# Patient Record
Sex: MALE | Race: WHITE | NOT HISPANIC OR LATINO | Employment: OTHER | URBAN - METROPOLITAN AREA
[De-identification: names, ages, dates, MRNs, and addresses within clinical notes are randomized per-mention and may not be internally consistent; named-entity substitution may affect disease eponyms.]

---

## 2018-07-03 ENCOUNTER — COMMUNICATION - HEALTHEAST (OUTPATIENT)
Dept: TELEHEALTH | Facility: CLINIC | Age: 68
End: 2018-07-03

## 2018-07-03 ENCOUNTER — OFFICE VISIT - HEALTHEAST (OUTPATIENT)
Dept: FAMILY MEDICINE | Facility: CLINIC | Age: 68
End: 2018-07-03

## 2018-07-03 DIAGNOSIS — F40.240 CLAUSTROPHOBIA: ICD-10-CM

## 2018-07-03 DIAGNOSIS — R03.0 ELEVATED BP WITHOUT DIAGNOSIS OF HYPERTENSION: ICD-10-CM

## 2018-07-03 RX ORDER — IBUPROFEN 200 MG
200 TABLET ORAL EVERY 6 HOURS PRN
Status: SHIPPED | COMMUNITY
Start: 2018-07-03 | End: 2022-09-23 | Stop reason: SINTOL

## 2018-07-05 ENCOUNTER — OFFICE VISIT - HEALTHEAST (OUTPATIENT)
Dept: FAMILY MEDICINE | Facility: CLINIC | Age: 68
End: 2018-07-05

## 2018-07-05 DIAGNOSIS — I10 HYPERTENSION: ICD-10-CM

## 2018-07-05 DIAGNOSIS — F41.9 ANXIETY: ICD-10-CM

## 2018-07-09 ENCOUNTER — OFFICE VISIT - HEALTHEAST (OUTPATIENT)
Dept: FAMILY MEDICINE | Facility: CLINIC | Age: 68
End: 2018-07-09

## 2018-07-09 DIAGNOSIS — I10 HYPERTENSION: ICD-10-CM

## 2018-07-09 DIAGNOSIS — F41.9 ANXIETY: ICD-10-CM

## 2018-07-09 ASSESSMENT — MIFFLIN-ST. JEOR: SCORE: 1610.04

## 2018-07-23 ENCOUNTER — OFFICE VISIT - HEALTHEAST (OUTPATIENT)
Dept: FAMILY MEDICINE | Facility: CLINIC | Age: 68
End: 2018-07-23

## 2018-07-23 DIAGNOSIS — Z12.5 ENCOUNTER FOR SCREENING FOR MALIGNANT NEOPLASM OF PROSTATE: ICD-10-CM

## 2018-07-23 DIAGNOSIS — Z12.11 SCREEN FOR COLON CANCER: ICD-10-CM

## 2018-07-23 DIAGNOSIS — F41.9 ANXIETY: ICD-10-CM

## 2018-07-23 DIAGNOSIS — I10 BENIGN ESSENTIAL HYPERTENSION: ICD-10-CM

## 2018-07-23 DIAGNOSIS — Z13.220 SCREENING FOR LIPID DISORDERS: ICD-10-CM

## 2018-07-23 DIAGNOSIS — K21.9 GERD (GASTROESOPHAGEAL REFLUX DISEASE): ICD-10-CM

## 2018-07-23 DIAGNOSIS — Z00.00 ROUTINE HEALTH MAINTENANCE: ICD-10-CM

## 2018-07-23 ASSESSMENT — MIFFLIN-ST. JEOR: SCORE: 1591.9

## 2018-08-07 ENCOUNTER — OFFICE VISIT - HEALTHEAST (OUTPATIENT)
Dept: FAMILY MEDICINE | Facility: CLINIC | Age: 68
End: 2018-08-07

## 2018-08-07 DIAGNOSIS — F41.9 ANXIETY: ICD-10-CM

## 2018-08-07 DIAGNOSIS — I10 BENIGN ESSENTIAL HYPERTENSION: ICD-10-CM

## 2018-08-07 DIAGNOSIS — G47.9 SLEEP DISTURBANCE: ICD-10-CM

## 2018-08-07 DIAGNOSIS — K59.00 CONSTIPATION: ICD-10-CM

## 2018-11-16 ENCOUNTER — RECORDS - HEALTHEAST (OUTPATIENT)
Dept: ADMINISTRATIVE | Facility: OTHER | Age: 68
End: 2018-11-16

## 2019-08-08 ENCOUNTER — COMMUNICATION - HEALTHEAST (OUTPATIENT)
Dept: FAMILY MEDICINE | Facility: CLINIC | Age: 69
End: 2019-08-08

## 2019-08-23 ENCOUNTER — OFFICE VISIT - HEALTHEAST (OUTPATIENT)
Dept: FAMILY MEDICINE | Facility: CLINIC | Age: 69
End: 2019-08-23

## 2019-08-23 DIAGNOSIS — Z13.220 SCREENING FOR LIPID DISORDERS: ICD-10-CM

## 2019-08-23 DIAGNOSIS — Z12.5 SPECIAL SCREENING FOR MALIGNANT NEOPLASM OF PROSTATE: ICD-10-CM

## 2019-08-23 DIAGNOSIS — I83.91 VARICOSE VEINS OF RIGHT LOWER EXTREMITY, UNSPECIFIED WHETHER COMPLICATED: ICD-10-CM

## 2019-08-23 DIAGNOSIS — Z11.59 NEED FOR HEPATITIS C SCREENING TEST: ICD-10-CM

## 2019-08-23 DIAGNOSIS — Z12.11 SCREEN FOR COLON CANCER: ICD-10-CM

## 2019-08-23 DIAGNOSIS — I10 BENIGN ESSENTIAL HYPERTENSION: ICD-10-CM

## 2019-08-23 DIAGNOSIS — Z00.00 ROUTINE GENERAL MEDICAL EXAMINATION AT A HEALTH CARE FACILITY: ICD-10-CM

## 2019-08-23 LAB
ALBUMIN SERPL-MCNC: 4.1 G/DL (ref 3.5–5)
ALP SERPL-CCNC: 55 U/L (ref 45–120)
ALT SERPL W P-5'-P-CCNC: 16 U/L (ref 0–45)
ANION GAP SERPL CALCULATED.3IONS-SCNC: 10 MMOL/L (ref 5–18)
AST SERPL W P-5'-P-CCNC: 16 U/L (ref 0–40)
BILIRUB SERPL-MCNC: 1.3 MG/DL (ref 0–1)
BUN SERPL-MCNC: 18 MG/DL (ref 8–22)
CALCIUM SERPL-MCNC: 9.8 MG/DL (ref 8.5–10.5)
CHLORIDE BLD-SCNC: 104 MMOL/L (ref 98–107)
CHOLEST SERPL-MCNC: 171 MG/DL
CO2 SERPL-SCNC: 27 MMOL/L (ref 22–31)
CREAT SERPL-MCNC: 0.98 MG/DL (ref 0.7–1.3)
FASTING STATUS PATIENT QL REPORTED: YES
GFR SERPL CREATININE-BSD FRML MDRD: >60 ML/MIN/1.73M2
GLUCOSE BLD-MCNC: 99 MG/DL (ref 70–125)
HDLC SERPL-MCNC: 64 MG/DL
LDLC SERPL CALC-MCNC: 91 MG/DL
POTASSIUM BLD-SCNC: 4.3 MMOL/L (ref 3.5–5)
PROT SERPL-MCNC: 6.8 G/DL (ref 6–8)
PSA SERPL-MCNC: 4.2 NG/ML (ref 0–4.5)
SODIUM SERPL-SCNC: 141 MMOL/L (ref 136–145)
TRIGL SERPL-MCNC: 80 MG/DL

## 2019-08-23 ASSESSMENT — MIFFLIN-ST. JEOR: SCORE: 1633.97

## 2019-08-26 LAB — HCV AB SERPL QL IA: NEGATIVE

## 2019-09-03 ENCOUNTER — AMBULATORY - HEALTHEAST (OUTPATIENT)
Dept: LAB | Facility: CLINIC | Age: 69
End: 2019-09-03

## 2019-09-03 DIAGNOSIS — Z12.11 SCREEN FOR COLON CANCER: ICD-10-CM

## 2019-09-03 LAB
HEMOCCULT SP1 STL QL: NEGATIVE
HEMOCCULT SP2 STL QL: NEGATIVE
HEMOCCULT SP3 STL QL: NEGATIVE

## 2019-11-19 ENCOUNTER — OFFICE VISIT - HEALTHEAST (OUTPATIENT)
Dept: FAMILY MEDICINE | Facility: CLINIC | Age: 69
End: 2019-11-19

## 2019-11-19 DIAGNOSIS — M54.2 NECK PAIN: ICD-10-CM

## 2019-11-19 DIAGNOSIS — I10 BENIGN ESSENTIAL HYPERTENSION: ICD-10-CM

## 2019-12-17 ENCOUNTER — OFFICE VISIT - HEALTHEAST (OUTPATIENT)
Dept: FAMILY MEDICINE | Facility: CLINIC | Age: 69
End: 2019-12-17

## 2019-12-17 DIAGNOSIS — M54.2 NECK PAIN: ICD-10-CM

## 2019-12-17 DIAGNOSIS — I10 BENIGN ESSENTIAL HYPERTENSION: ICD-10-CM

## 2020-08-05 ENCOUNTER — COMMUNICATION - HEALTHEAST (OUTPATIENT)
Dept: FAMILY MEDICINE | Facility: CLINIC | Age: 70
End: 2020-08-05

## 2020-08-05 DIAGNOSIS — I10 BENIGN ESSENTIAL HYPERTENSION: ICD-10-CM

## 2020-08-27 ENCOUNTER — OFFICE VISIT - HEALTHEAST (OUTPATIENT)
Dept: INTERNAL MEDICINE | Facility: CLINIC | Age: 70
End: 2020-08-27

## 2020-08-27 ENCOUNTER — COMMUNICATION - HEALTHEAST (OUTPATIENT)
Dept: INTERNAL MEDICINE | Facility: CLINIC | Age: 70
End: 2020-08-27

## 2020-08-27 DIAGNOSIS — L98.9 BENIGN SKIN LESION OF FOREHEAD: ICD-10-CM

## 2020-08-27 DIAGNOSIS — I10 BENIGN ESSENTIAL HYPERTENSION: ICD-10-CM

## 2020-08-27 DIAGNOSIS — Z00.00 ROUTINE GENERAL MEDICAL EXAMINATION AT A HEALTH CARE FACILITY: ICD-10-CM

## 2020-08-27 DIAGNOSIS — E66.09 CLASS 1 OBESITY DUE TO EXCESS CALORIES WITHOUT SERIOUS COMORBIDITY WITH BODY MASS INDEX (BMI) OF 30.0 TO 30.9 IN ADULT: ICD-10-CM

## 2020-08-27 DIAGNOSIS — R35.0 BENIGN PROSTATIC HYPERPLASIA WITH URINARY FREQUENCY: ICD-10-CM

## 2020-08-27 DIAGNOSIS — Z12.5 SCREENING PSA (PROSTATE SPECIFIC ANTIGEN): ICD-10-CM

## 2020-08-27 DIAGNOSIS — Z12.11 SCREEN FOR COLON CANCER: ICD-10-CM

## 2020-08-27 DIAGNOSIS — N40.1 BENIGN PROSTATIC HYPERPLASIA WITH URINARY FREQUENCY: ICD-10-CM

## 2020-08-27 DIAGNOSIS — E66.811 CLASS 1 OBESITY DUE TO EXCESS CALORIES WITHOUT SERIOUS COMORBIDITY WITH BODY MASS INDEX (BMI) OF 30.0 TO 30.9 IN ADULT: ICD-10-CM

## 2020-08-27 DIAGNOSIS — K21.9 GASTROESOPHAGEAL REFLUX DISEASE WITHOUT ESOPHAGITIS: ICD-10-CM

## 2020-08-27 LAB
ALBUMIN SERPL-MCNC: 4 G/DL (ref 3.5–5)
ALP SERPL-CCNC: 58 U/L (ref 45–120)
ALT SERPL W P-5'-P-CCNC: 20 U/L (ref 0–45)
ANION GAP SERPL CALCULATED.3IONS-SCNC: 8 MMOL/L (ref 5–18)
AST SERPL W P-5'-P-CCNC: 16 U/L (ref 0–40)
BILIRUB SERPL-MCNC: 1.5 MG/DL (ref 0–1)
BUN SERPL-MCNC: 15 MG/DL (ref 8–28)
CALCIUM SERPL-MCNC: 9.6 MG/DL (ref 8.5–10.5)
CHLORIDE BLD-SCNC: 101 MMOL/L (ref 98–107)
CHOLEST SERPL-MCNC: 180 MG/DL
CO2 SERPL-SCNC: 27 MMOL/L (ref 22–31)
CREAT SERPL-MCNC: 0.98 MG/DL (ref 0.7–1.3)
ERYTHROCYTE [DISTWIDTH] IN BLOOD BY AUTOMATED COUNT: 12.3 % (ref 11–14.5)
FASTING STATUS PATIENT QL REPORTED: YES
GFR SERPL CREATININE-BSD FRML MDRD: >60 ML/MIN/1.73M2
GLUCOSE BLD-MCNC: 102 MG/DL (ref 70–125)
HCT VFR BLD AUTO: 47.7 % (ref 40–54)
HDLC SERPL-MCNC: 60 MG/DL
HGB BLD-MCNC: 16.5 G/DL (ref 14–18)
LDLC SERPL CALC-MCNC: 105 MG/DL
MCH RBC QN AUTO: 31.8 PG (ref 27–34)
MCHC RBC AUTO-ENTMCNC: 34.5 G/DL (ref 32–36)
MCV RBC AUTO: 92 FL (ref 80–100)
PLATELET # BLD AUTO: 192 THOU/UL (ref 140–440)
PMV BLD AUTO: 8.1 FL (ref 7–10)
POTASSIUM BLD-SCNC: 4.5 MMOL/L (ref 3.5–5)
PROT SERPL-MCNC: 6.9 G/DL (ref 6–8)
PSA SERPL-MCNC: 3.6 NG/ML (ref 0–6.5)
RBC # BLD AUTO: 5.18 MILL/UL (ref 4.4–6.2)
SODIUM SERPL-SCNC: 136 MMOL/L (ref 136–145)
TRIGL SERPL-MCNC: 74 MG/DL
WBC: 6.1 THOU/UL (ref 4–11)

## 2020-08-27 RX ORDER — LISINOPRIL 30 MG/1
30 TABLET ORAL DAILY
Qty: 180 TABLET | Refills: 1 | Status: SHIPPED | OUTPATIENT
Start: 2020-08-27 | End: 2021-10-14

## 2020-08-27 RX ORDER — ACETAMINOPHEN/DIPHENHYDRAMINE 500MG-25MG
TABLET ORAL
Status: SHIPPED | COMMUNITY
Start: 2020-03-01 | End: 2022-09-23

## 2020-08-27 ASSESSMENT — MIFFLIN-ST. JEOR: SCORE: 1637.99

## 2020-09-22 ENCOUNTER — RECORDS - HEALTHEAST (OUTPATIENT)
Dept: ADMINISTRATIVE | Facility: OTHER | Age: 70
End: 2020-09-22

## 2020-09-22 LAB — COLOGUARD-ABSTRACT: NEGATIVE

## 2020-09-28 ENCOUNTER — COMMUNICATION - HEALTHEAST (OUTPATIENT)
Dept: ADMINISTRATIVE | Facility: CLINIC | Age: 70
End: 2020-09-28

## 2020-10-01 ENCOUNTER — RECORDS - HEALTHEAST (OUTPATIENT)
Dept: HEALTH INFORMATION MANAGEMENT | Facility: CLINIC | Age: 70
End: 2020-10-01

## 2020-10-02 ENCOUNTER — RECORDS - HEALTHEAST (OUTPATIENT)
Dept: ADMINISTRATIVE | Facility: OTHER | Age: 70
End: 2020-10-02

## 2021-01-06 ENCOUNTER — OFFICE VISIT - HEALTHEAST (OUTPATIENT)
Dept: INTERNAL MEDICINE | Facility: CLINIC | Age: 71
End: 2021-01-06

## 2021-01-06 DIAGNOSIS — I10 BENIGN ESSENTIAL HYPERTENSION: ICD-10-CM

## 2021-01-06 DIAGNOSIS — R35.0 BENIGN PROSTATIC HYPERPLASIA WITH URINARY FREQUENCY: ICD-10-CM

## 2021-01-06 DIAGNOSIS — N40.1 BENIGN PROSTATIC HYPERPLASIA WITH URINARY FREQUENCY: ICD-10-CM

## 2021-01-06 DIAGNOSIS — K59.01 SLOW TRANSIT CONSTIPATION: ICD-10-CM

## 2021-01-06 LAB — TSH SERPL DL<=0.005 MIU/L-ACNC: 2.78 UIU/ML (ref 0.3–5)

## 2021-03-02 ENCOUNTER — AMBULATORY - HEALTHEAST (OUTPATIENT)
Dept: NURSING | Facility: CLINIC | Age: 71
End: 2021-03-02

## 2021-03-23 ENCOUNTER — AMBULATORY - HEALTHEAST (OUTPATIENT)
Dept: NURSING | Facility: CLINIC | Age: 71
End: 2021-03-23

## 2021-04-30 ENCOUNTER — RECORDS - HEALTHEAST (OUTPATIENT)
Dept: ADMINISTRATIVE | Facility: OTHER | Age: 71
End: 2021-04-30

## 2021-05-31 ENCOUNTER — RECORDS - HEALTHEAST (OUTPATIENT)
Dept: ADMINISTRATIVE | Facility: CLINIC | Age: 71
End: 2021-05-31

## 2021-05-31 NOTE — PROGRESS NOTES
Assessment and Plan:     1. Routine general medical examination at a health care facility    2. Benign essential hypertension  Optimal control.  Continue   - Comprehensive Metabolic Panel  - lisinopril (PRINIVIL,ZESTRIL) 30 MG tablet; Take 1 tablet (30 mg total) by mouth daily.  Dispense: 90 tablet; Refill: 3    3. Screen for colon cancer  Declines colonoscopy  - Occult Blood(ICT); Future    4. Screening for lipid disorders  - Lipid [place] FASTING    5. Special screening for malignant neoplasm of prostate  - PSA, Annual Screen (Prostatic-Specific Antigen)    6. Need for hepatitis C screening test  - Hepatitis C Antibody (Anti-HCV)    7. Varicose veins of right lower extremity, unspecified whether complicated  Not painful and no significant swelling.  Offered a vascular consult, but patient declines at time.  Encouraged a compression stocking.     The patient's current medical problems were reviewed.    I have had an Advance Directives discussion with the patient.  The following health maintenance schedule was reviewed with the patient and provided in printed form in the after visit summary:   Health Maintenance   Topic Date Due     HEPATITIS C SCREENING  1950     FECAL OCCULT BLOOD/FIT ANNUAL COLON CANCER SCREENING  01/12/1968     ZOSTER VACCINES (1 of 2) 01/12/2000     TD 18+ HE  09/08/2002     PNEUMOCOCCAL POLYSACCHARIDE VACCINE AGE 65 AND OVER  01/12/2015     INFLUENZA VACCINE RULE BASED (1) 08/01/2019     MEDICARE ANNUAL WELLNESS VISIT  08/23/2020     FALL RISK ASSESSMENT  08/23/2020     ADVANCE CARE PLANNING  07/23/2023     PNEUMOCOCCAL CONJUGATE VACCINE FOR ADULTS (PCV13 OR PREVNAR)  Completed        Subjective:   Chief Complaint: Roland Dejesus is an 69 y.o. male here for an Annual Wellness visit.   HPI: Patient is  with one daughter.  Him and his wife enjoy traveling.  They have a couple of road trips planned this fall.    History of hypertension.  He is on lisinopril.  Checking blood  pressures at home with very good readings.  He does have a log with him today.  They range between 121//73.  Denies any chest pain, shortness of breath, lightheadedness/dizziness, or palpitations.  He is wondering if he still needs to take a daily aspirin.    Patient has chronic varicose veins in his right leg.  He did have them stripped about 25 years ago.  They do not offer him significant pain, and he does not variance a lot of swelling in the leg.  No varicose veins in the left leg.  Wondering if he needs to get this looked at.  He has not had any redness to the area.    Patient has never had a colonoscopy.  He is not really very interested.  His grandfather had a history of colon cancer in his 90s.  Patient denies any blood in his stools.  He would be open to doing some fecal testing.  We did discuss that if this is positive, I would recommend a colonoscopy.    She denies any disruptive urinary symptoms.  No trouble starting or stopping his urine stream.  No urinary frequency or urgency.    Patient received a [employer] last year pharmacy.    Gets intermittent ringing in his ears.  This is not terribly bothersome.    Patient dealt with some significant anxiety last year.  He did end up seeing a therapist, but did not think this was terribly helpful.  Reports his anxiety is very manageable and he has not had issues the last year with this.  Denies any depressive symptoms.    Review of Systems: Please see above.  The rest of the review of systems are negative for all systems.    Patient Care Team:  Janet Hernandez NP as PCP - General (Family Medicine)     Patient Active Problem List   Diagnosis     Mantoux: positive     Varicose veins of leg with swelling, right     Benign essential hypertension     Anxiety     Sleep disturbance     Past Medical History:   Diagnosis Date     Anxiety       Past Surgical History:   Procedure Laterality Date     KNEE ARTHROSCOPY Right      VARICOSE VEIN SURGERY        Family  History   Problem Relation Age of Onset     COPD Mother      Heart disease Mother      Heart failure Mother      Parkinsonism Father      Aneurysm Brother      Prostate cancer Brother      Anxiety disorder Brother      Depression Brother      Alcohol abuse Brother      Drug abuse Brother       Social History     Socioeconomic History     Marital status:      Spouse name: Not on file     Number of children: Not on file     Years of education: Not on file     Highest education level: Not on file   Occupational History     Not on file   Social Needs     Financial resource strain: Not on file     Food insecurity:     Worry: Not on file     Inability: Not on file     Transportation needs:     Medical: Not on file     Non-medical: Not on file   Tobacco Use     Smoking status: Former Smoker     Smokeless tobacco: Never Used   Substance and Sexual Activity     Alcohol use: Yes     Alcohol/week: 1.8 oz     Types: 3 Glasses of wine per week     Drug use: No     Sexual activity: Yes     Partners: Female   Lifestyle     Physical activity:     Days per week: Not on file     Minutes per session: Not on file     Stress: Not on file   Relationships     Social connections:     Talks on phone: Not on file     Gets together: Not on file     Attends L[employer] day service: Not on file     Active member of club or organization: Not on file     Attends meetings of clubs or organizations: Not on file     Relationship status: Not on file     Intimate partner violence:     Fear of current or ex partner: Not on file     Emotionally abused: Not on file     Physically abused: Not on file     Forced sexual activity: Not on file   Other Topics Concern     Not on file   Social History Narrative     Not on file      Current Outpatient Medications   Medication Sig Dispense Refill     aspirin 81 MG EC tablet Take 81 mg by mouth daily.       ibuprofen (ADVIL,MOTRIN) 200 MG tablet Take 200 mg by mouth every 6 (six) hours as needed for pain.        "loratadine 5 mg TbDL Take 5 mg by mouth daily.        ranitidine (ZANTAC) 75 MG tablet Take 75 mg by mouth 2 (two) times a day.        lisinopril (PRINIVIL,ZESTRIL) 30 MG tablet Take 1 tablet (30 mg total) by mouth daily. 90 tablet 3     No current facility-administered medications for this visit.       Objective:   Vital Signs:   Visit Vitals  /88   Pulse (!) 58   Temp 97.9  F (36.6  C)   Ht 5' 8\" (1.727 m)   Wt 199 lb 6.4 oz (90.4 kg)   BMI 30.32 kg/m         VisionScreening:  No exam data present     PHYSICAL EXAM    General Appearance: Alert, cooperative, no distress, appears stated age  Eyes: PERRL, conjunctiva/corneas clear, EOM's intact. Wearing corrective glasses.  Ears: Normal TM's and external ear canals, both ears  Nose: Nares normal, septum midline  Throat: Lips, mucosa, and tongue normal; teeth and gums normal  Neck: Supple, symmetrical, trachea midline, no adenopathy; thyroid: not enlarged, symmetric, no tenderness/mass/nodules; no carotid bruit or JVD  Back: no CVA tenderness  Lungs: Clear to auscultation bilaterally, respirations unlabored  Heart: Regular rate and rhythm, S1 and S2 normal, no murmur, rub, or gallop   Abdomen: Soft, non-tender, bowel sounds active all four quadrants,  no masses, no organomegaly  Extremities: Extremities normal, atraumatic, no cyanosis or edema. Varicose veins to the right leg.   Skin: Skin color, texture, turgor normal, no rashes  Lymph nodes: Cervical, supraclavicular, and axillary nodes normal  Neurologic: Normal   Psychologic: appropriate affective, answers all of my questions appropriately. No hallucinations, delusion, or suicidal ideationss      Assessment Results 8/23/2019   Activities of Daily Living No help needed   Instrumental Activities of Daily Living No help needed   Get Up and Go Score Less than 12 seconds   Mini Cog Total Score 5   Some recent data might be hidden     A Mini-Cog score of 0-2 suggests the possibility of dementia, score of 3-5 " suggests no dementia    Identified Health Risks:     The patient's PHQ-9 score is normal.   Information regarding advance directives (living brink), including where he can download the appropriate form, was provided to the patient via the AVS.     Janet Hernandez NP

## 2021-06-01 VITALS — BODY MASS INDEX: 29.55 KG/M2 | HEIGHT: 68 IN | WEIGHT: 195 LBS

## 2021-06-01 VITALS — WEIGHT: 197.9 LBS

## 2021-06-01 VITALS — BODY MASS INDEX: 28.95 KG/M2 | HEIGHT: 68 IN | WEIGHT: 191 LBS

## 2021-06-01 VITALS — BODY MASS INDEX: 28.64 KG/M2 | WEIGHT: 187 LBS

## 2021-06-01 VITALS — WEIGHT: 198.38 LBS

## 2021-06-03 VITALS — BODY MASS INDEX: 30.22 KG/M2 | WEIGHT: 199.4 LBS | HEIGHT: 68 IN

## 2021-06-03 NOTE — PROGRESS NOTES
"Assessment/Plan:     1. Benign essential hypertension  Suboptimal control.  Could be related to recent pain, but I would like better control regardless.  Add Amlodipine 5 mg once daily.  Continue Lisinopril at current dose. Consider titrating Lisinopril to max dose of 40 mg if suboptimal control at follow up.  Encourage patient to continue checking blood pressures at home with goal < 140/90.  Follow-up in 4 weeks.  - amLODIPine (NORVASC) 5 MG tablet; Take 1 tablet (5 mg total) by mouth daily.  Dispense: 90 tablet; Refill: 0    2. Neck pain  Neck and upper back pain improved since recent ED visit.  Patient is requesting a Medrol Dosepak, and I do think this would be appropriate.  Discussed proper use and possible side effects of this medication.  I recommend limiting his Advil/ibuprofen while he is using this.  Continue chiropractic care, as this has been helpful.  Otherwise, follow-up with me with new/worsening symptoms.  - methylPREDNISolone (MEDROL DOSEPACK) 4 mg tablet; Take 1 tablet (4 mg total) by mouth daily for 6 days. Follow package directions  Dispense: 21 tablet; Refill: 0        Subjective:     Roland Dejesus is a 69 y.o. male who presents for follow-up regarding recent ED visit.  Patient was seen in the ED 2 days ago with symptoms of right neck and upper back pain.  He is currently working with a chiropractor for this.  Reports a \"burning\" sensation to his upper back area with specific neck movements.  He denies any radicular pain down into the arm.  No trouble with  strength.  Patient was treated with IV pain medication and diazepam.  Symptoms did improve in the ER.  Patient's blood pressure was noted to be elevated.  He is on lisinopril 30 mg once daily for blood pressure.  Patient has noticed some mild elevations of his blood pressures at home over the last couple of months.  He does have a log with him.  Readings in October were in the 140s over 70s.  The last couple of days, he has had readings " of 198/89 and 168/91.  Patient is otherwise feeling well.  He denies any chest pain, shortness of breath, headaches, or lightheadedness/dizziness.  He is not feeling terribly fatigued.  His pain is better today.  He is wondering if a Medrol Dosepak would be beneficial for his neck and upper back pain, as recommended by his chiropractor.  Patient is currently taking Advil 2-3 times a day.  Of note, he also had a CTA of the neck in the ER, and this was negative.      The following portions of the patient's history were reviewed and updated as appropriate: allergies, current medications.    Review of Systems  A comprehensive review of systems was performed and was otherwise negative    Objective:     BP (!) 188/102   Pulse 64   Temp 97.8  F (36.6  C)   Wt 201 lb 14.4 oz (91.6 kg)   BMI 30.70 kg/m      General Appearance: Alert, cooperative, no distress, appears stated age  Neck: Cervical spine is straight and nontender.  Tenderness palpated to the right cervical paraspinal muscles and extending into the right trapezius.  Normal range of motion, but increased discomfort with left rotation.   strength equal bilaterally.  Lungs: Clear to auscultation bilaterally, respirations unlabored  Heart: Regular rate and rhythm, S1 and S2 normal, no murmur, rub, or gallop. No LE edema     Janet Hernandez NP

## 2021-06-04 VITALS
SYSTOLIC BLOOD PRESSURE: 122 MMHG | HEART RATE: 64 BPM | WEIGHT: 204.7 LBS | DIASTOLIC BLOOD PRESSURE: 76 MMHG | BODY MASS INDEX: 31.12 KG/M2

## 2021-06-04 VITALS
TEMPERATURE: 98.1 F | BODY MASS INDEX: 30.3 KG/M2 | OXYGEN SATURATION: 98 % | DIASTOLIC BLOOD PRESSURE: 80 MMHG | HEIGHT: 68 IN | WEIGHT: 199.9 LBS | SYSTOLIC BLOOD PRESSURE: 132 MMHG | HEART RATE: 59 BPM

## 2021-06-04 VITALS
DIASTOLIC BLOOD PRESSURE: 102 MMHG | HEART RATE: 64 BPM | WEIGHT: 201.9 LBS | BODY MASS INDEX: 30.7 KG/M2 | SYSTOLIC BLOOD PRESSURE: 188 MMHG | TEMPERATURE: 97.8 F

## 2021-06-04 NOTE — PROGRESS NOTES
Assessment/Plan:     1. Benign essential hypertension  Blood pressures have been normal without the addition of Amlodipine.  Suspect elevation was associated with pain.  Optimal BP today in clinic.  Will hold off on starting the Amlodipine.  Continue Lisinopril.  Continue monitoring BPs at home with goal < 140/90.  He will follow up with increased readings.     2. Neck pain  This improved with a Medrol dosepak.  He continues to see a chiropractor.         Subjective:     Roland Dejesus is a 69 y.o. male who presents for follow-up regarding hypertension.  Patient was started on amlodipine about 1 month ago due to elevated blood pressures.  His blood pressures were found to be elevated when he presented to the ED with some neck pain.  I treated patient with a Medrol Dosepak last month due to the neck pain.  He did not start the amlodipine, as he did not want to start 2 medications at once.  However, his blood pressures have been well controlled at home just on his lisinopril.  Readings range from 126//74.  In general, average readings are in the 130s over 80s.  Patient's neck pain is better.  He is still working with a chiropractor.      The following portions of the patient's history were reviewed and updated as appropriate: allergies, current medications.    Review of Systems  A comprehensive review of systems was performed and was otherwise negative    Objective:     /76   Pulse 64   Wt 204 lb 11.2 oz (92.9 kg)   BMI 31.12 kg/m      General Appearance: Alert, cooperative, no distress, appears stated age  Lungs: Clear to auscultation bilaterally, respirations unlabored  Heart: Regular rate and rhythm, S1 and S2 normal, no murmur, rub, or gallop    Janet Hernandez NP

## 2021-06-05 VITALS
BODY MASS INDEX: 26.6 KG/M2 | WEIGHT: 175.5 LBS | SYSTOLIC BLOOD PRESSURE: 168 MMHG | OXYGEN SATURATION: 96 % | HEART RATE: 60 BPM | TEMPERATURE: 97.6 F | DIASTOLIC BLOOD PRESSURE: 100 MMHG

## 2021-06-10 NOTE — TELEPHONE ENCOUNTER
Medication Question or Clarification  Who is calling: Patient  What medication are you calling about (include dose and sig)?:   lisinopril (PRINIVIL,ZESTRIL) 30 MG tablet  30 mg, Oral, DAILY         Summary: Take 1 tablet (30 mg total) by mouth daily., Starting Fri 8/23/2019, Normal      Who prescribed the medication?: Janet Hernandez, NP  What is your question/concern?: Patient needs a bridge until his annual visit.  He is asking to make an appointment for AWNOHEMI in September.  He will be out of medication on 8/22/2020.  Will Janet bridge the lisinopril?  Please advise.     Requested Pharmacy: [id number]  Okay to leave a detailed message?: Yes

## 2021-06-10 NOTE — PROGRESS NOTES
Assessment and Plan:     Annual wellness visit for this 70-year-old man, retired [employer], who has been enjoying good health over the last year.  Issues are benign essential hypertension, with good blood pressure control on monotherapy with lisinopril.  Overweight with body mass index of 30.3, he really does need to lose 25 to 30 pounds over the next year.  Gastroesophageal reflux, was getting good results from ranitidine, recommend switch to famotidine.  Raised erythematous skin lesion on forehead, will send to dermatology.  Due for age-appropriate average risk colon cancer screening, will pursue Cologuard stool test.  Mild symptoms of benign prostatic hyperplasia, with some urinary bladder frequency, but prostate palpably normal on physical exam, will check PSA today.  Up-to-date on vaccinations except shingles, and also he should get annual flu vaccine.  History of cervical radiculopathic neck pain November 2019 which resolved after administration of a steroid Dosepak.    He is fasting this morning, so we will get comprehensive metabolic panel, blood cell counts, lipid profile, and screening PSA.  Cologuard is ordered.    Going issue by issue:    Benign essential hypertension, with good blood pressure control on monotherapy with lisinopril.  Today's in clinic blood pressure of 132/80 is quite good.  He showed me his home blood pressure readings, and most systolic readings are in the 120s, most diastolics in the 70s.  He does need to lose some weight, which would be good for blood pressure.  He did not need to start amlodipine.  He told me that alcohol consumption is approximately less than 1 drink per day, typically a beer or 1 glass of wine.  That does not sound excessive.  It does have some calories however.  I do not think that degree of alcohol consumption would raise blood pressure.  He snores, but he is quite sure he does not have obstructive sleep apnea.    Overweight with body mass index of 30.3, he  really does need to lose 25 to 30 pounds over the next year.  For weight control, I reminded him about the importance of portion control, eating slower, and identifying problem foods to curtail or eliminate.  Even 1 serving of alcohol per day brings may be 2 or 300 carbohydrate calories.  He enjoys walking and biking.  I would encourage her to add some strength training.  Strengthening the muscles of his upper back might also be good for his neck.    Gastroesophageal reflux, was getting good results from ranitidine, recommend switch to famotidine.  I told her that famotidine is the generic form of Pepcid.  He can also use the proton pump inhibitor omeprazole which is available over-the-counter as Prilosec OTC.    Raised erythematous skin lesion on forehead, will send to dermatology.  He told me the skin lesion on his forehead is been there for maybe 2 years.  It scales up, then sometimes bleeds.  Is not pigmented, but I do think it needs to be checked by dermatology to rule out the possibility of amelanotic melanoma.    Due for age-appropriate average risk colon cancer screening, will pursue Cologuard stool test.      Mild symptoms of benign prostatic hyperplasia, with some urinary bladder frequency, but prostate palpably normal on physical exam, will check PSA today.      Up-to-date on vaccinations except shingles, and also he should get annual flu vaccine.     History of cervical radiculopathic neck pain November 2019 which resolved after administration of a steroid Dosepak.      The patient's current medical problems were reviewed.    I have had an Advance Directives discussion with the patient.  The following health maintenance schedule was reviewed with the patient and provided in printed form in the after visit summary:   Health Maintenance   Topic Date Due     ZOSTER VACCINES (1 of 2) 01/12/2000     PNEUMOCOCCAL IMMUNIZATION 65+ LOW/MEDIUM RISK (1 of 2 - PCV13) 01/12/2015     INFLUENZA VACCINE RULE BASED (1)  08/01/2020     FALL RISK ASSESSMENT  08/23/2020     MEDICARE ANNUAL WELLNESS VISIT  08/23/2020     COLORECTAL CANCER SCREENING  09/03/2020     LIPID  08/23/2024     ADVANCE CARE PLANNING  08/23/2024     TD 18+ HE  08/23/2029     HEPATITIS C SCREENING  Completed     HEPATITIS B VACCINES  Aged Out        Subjective:   Chief Complaint: Roland Dejesus is an 70 y.o. male here for an Annual Wellness visit.   HPI:      Benign essential hypertension  Did not need Amlodipine.  Continue Lisinopril    BP Readings from Last 3 Encounters:   08/27/20 132/80   12/17/19 122/76   11/19/19 (!) 188/102     GERD  Was taking zantac  Swallowing OK    Lab Results   Component Value Date    CHOL 171 08/23/2019     Lab Results   Component Value Date    HDL 64 08/23/2019     Lab Results   Component Value Date    LDLCALC 91 08/23/2019     Lab Results   Component Value Date    TRIG 80 08/23/2019     No components found for: CHOLHDL       Lab Results   Component Value Date    PSA 4.2 08/23/2019     Neck pain- November 2019  This improved with a Medrol dosepak.  He continues to see a chiropractor.  He is still working with a chiropractor.    9/3/19 1217        Fecal Occult Bld (ICT) 1  Negative  Negative       Fecal Occult Blood (ICT) 2  Negative  Negative       Fecal Occult Blood (ICT) 3  Negative      Could get shingles vaccine     Immunization History   Administered Date(s) Administered     Hep A, Adult IM (19yr & older) 12/02/1995     Hep A, historic 08/18/1994, 09/20/1994     Hep B, historic 06/02/1994, 07/14/1994, 12/28/1994     IPV 09/08/1992     Immune Globulin (Ig) Received 09/08/1992, 02/02/1993, 07/28/1993, 06/02/1994     Influenza high dose,seasonal,PF, 65+ yrs 10/25/2019     Pneumococcal Vaccine, Unspecified 01/23/2020     Td,adult,historic,unspecified 09/08/1992     Tdap 08/23/2019     Typhoid, historic, Unspecified 07/14/1994       Review of Systems:    Please see above.  The rest of the review of systems are negative for all  systems.    Patient Care Team:  Janet Hernandez NP as PCP - General (Family Medicine)  Janet Hernandez NP as Assigned PCP     Patient Active Problem List   Diagnosis     Mantoux: positive     Varicose veins of leg with swelling, right     Benign essential hypertension     Anxiety     Sleep disturbance     Past Medical History:   Diagnosis Date     Anxiety       Past Surgical History:   Procedure Laterality Date     KNEE ARTHROSCOPY Right      VARICOSE VEIN SURGERY        Family History   Problem Relation Age of Onset     COPD Mother      Heart disease Mother      Heart failure Mother      Parkinsonism Father      Aneurysm Brother      Prostate cancer Brother      Anxiety disorder Brother      Depression Brother      Alcohol abuse Brother      Drug abuse Brother       Social History     Socioeconomic History     Marital status:      Spouse name: Not on file     Number of children: Not on file     Years of education: Not on file     Highest education level: Not on file   Occupational History     Not on file   Social Needs     Financial resource strain: Not on file     Food insecurity     Worry: Not on file     Inability: Not on file     Transportation needs     Medical: Not on file     Non-medical: Not on file   Tobacco Use     Smoking status: Former Smoker     Smokeless tobacco: Never Used   Substance and Sexual Activity     Alcohol use: Yes     Alcohol/week: 3.0 standard drinks     Types: 3 Glasses of wine per week     Drug use: No     Sexual activity: Yes     Partners: Female   Lifestyle     Physical activity     Days per week: Not on file     Minutes per session: Not on file     Stress: Not on file   Relationships     Social connections     Talks on phone: Not on file     Gets together: Not on file     Attends [employer] service: Not on file     Active member of club or organization: Not on file     Attends meetings of clubs or organizations: Not on file     Relationship status: Not on file     Intimate  "partner violence     Fear of current or ex partner: Not on file     Emotionally abused: Not on file     Physically abused: Not on file     Forced sexual activity: Not on file   Other Topics Concern     Not on file   Social History Narrative     Not on file      Current Outpatient Medications   Medication Sig Dispense Refill     aspirin 81 MG EC tablet Take 81 mg by mouth daily.       glucosamine HCl/chondroitin sams (GLUCOSAMINE-CHONDROITIN) 2,000-1,200 mg/30 mL Liqd        ibuprofen (ADVIL,MOTRIN) 200 MG tablet Take 200 mg by mouth every 6 (six) hours as needed for pain.       lisinopriL (PRINIVIL,ZESTRIL) 30 MG tablet Take 1 tablet (30 mg total) by mouth daily. 90 tablet 0     loratadine 5 mg TbDL Take 5 mg by mouth daily.        UNABLE TO FIND Med Name: Tumeric       No current facility-administered medications for this visit.       Objective:   Vital Signs:   Visit Vitals  /80 (Patient Site: Right Arm, Patient Position: Sitting, Cuff Size: Adult Large)   Pulse (!) 59   Temp 98.1  F (36.7  C) (Oral)   Ht 5' 8.11\" (1.73 m)   Wt 199 lb 14.4 oz (90.7 kg)   SpO2 98%   BMI 30.30 kg/m           VisionScreening:  No exam data present     PHYSICAL EXAM    General: Alert, in no distress  Pleasant, moderately overweight, normal mental status, moves easily in exam room.  Skin: + Raised erythematous lesion about 1 cm diameter on his left mid forehead, there is shallow erosion centrally.    Eyes/nose/throat: Eyes without scleral icterus, eye movements normal, pupils equal and reactive, oropharynx clear, ears with normal TM's  MSK: Neck with good ROM  Lymphatic: Neck without adenopathy or masses  Endocrine: Thyroid with no nodules to palpation  Pulm: Lungs clear to auscultation bilaterally  Cardiac: Heart with regular rate and rhythm, no murmur or gallop  GI: Abdomen soft, nontender. No palpable enlargement of liver or spleen  MSK: Extremities no tenderness or edema  Neuro: Moves all extremities, without focal " weakness  Psych: Alert, normal mental status. Normal affect and speech  Prostate normal size, smooth, no nodules      Assessment Results 8/27/2020   Activities of Daily Living No help needed   Instrumental Activities of Daily Living No help needed   Get Up and Go Score -   Mini Cog Total Score 5   Some recent data might be hidden     A Mini-Cog score of 0-2 suggests the possibility of dementia, score of 3-5 suggests no dementia      Identified Health Risks:     Information regarding advance directives (living brink), including where he can download the appropriate form, was provided to the patient via the AVS.

## 2021-06-10 NOTE — TELEPHONE ENCOUNTER
Bridged patient's medication.  It appears that he is scheduled with Dr. Mahajan later this month.    Janet Hernandez NP

## 2021-06-11 ENCOUNTER — OFFICE VISIT - HEALTHEAST (OUTPATIENT)
Dept: INTERNAL MEDICINE | Facility: CLINIC | Age: 71
End: 2021-06-11

## 2021-06-11 DIAGNOSIS — M54.2 NECK PAIN: ICD-10-CM

## 2021-06-11 NOTE — TELEPHONE ENCOUNTER
Patient notified of clinician's message and verbalized understanding. No further questions at this time.   Yolanda Shi CMA ............... 10:23 AM, 09/28/20

## 2021-06-14 NOTE — PROGRESS NOTES
Office Visit - Follow Up   Roland Dejesus   70 y.o. male    Date of Visit: 1/6/2021    Chief Complaint   Patient presents with     Hernia     Constipation        -------------------------------------------------------------------------------------------------------------------------  Assessment and Plan    Constipation  Stools hard and dry  I reviewed with him the diet has been using to lose weight, and it sounds pretty healthy with plenty of fiber.  He has experienced constipation in the past when he went on a diet to lose weight.  MiraLAX seem to help a little, but he only took it for a few days.  I told him MiraLAX (polyethylene glycol powder) is safe to use even every day, 1 scoop dissolved in liquid.  It is an osmotic laxative, adds water content to the stools.  He could also use a stool softener capsule such as docusate.  I would generally avoid stimulant laxatives such as senna.    He did have a negative Cologuard test September 22, 2020.  He is never had a colonoscopy.  I told him that it would be quite reasonable to just simply get a colonoscopy in response to a change in stool habit.  He will let me know how his symptoms go, and I would have a low threshold to push him toward a colonoscopy.    Lets check a TSH thyroid cascade today, since last check was in 2018.    9/22/20    COLOGUARD_EXT Negative      Hernia inguinal right  Noticed more as he lost weight  Not painful  Hernia is approximately 3 cm, easily reducible, does not enter the scrotum.  He does not seem to be bothering him too much.  I told him that if it does start to become bothersome, please let me know, then I would send him for general surgery consultation which then might lead to surgical repair which could be done using a laparoscopic robotic technique, as an outpatient procedure, with excellent recovery time.    Benign essential hypertension, blood pressure control has been good on monotherapy with lisinopril 30 mg a day.  For some reason  his blood pressure this morning January 6 is a little bit elevated at 150/94.  He gets better readings on his home machine.      Perhaps could increase the lisinopril up to the full dose of 40 mg a day.  I am little surprised to see an elevated blood pressure reading was lost weight successfully.  Let me know how his numbers behave.    At home gets BP readings about 135/85  lisinopriL (PRINIVIL,ZESTRIL) 30 MG tablet    BP Readings from Last 3 Encounters:   01/06/21 (!) 150/94   08/27/20 132/80   12/17/19 122/76     Successful weight loss  He want to lose 5 pounds more  BMI now 26.6  He feels better    Diet: breakfast yogurt and fruit, biscuit with fiber  Lunch salad  Supper control portion size  No rice, no pasta, No bread    Wt Readings from Last 3 Encounters:   01/06/21 175 lb 8 oz (79.6 kg)   08/27/20 199 lb 14.4 oz (90.7 kg)   12/17/19 204 lb 11.2 oz (92.9 kg)      Gastroesophageal reflux, was getting good results from ranitidine, recommend switch to famotidine.  I told her that famotidine is the generic form of Pepcid.  He can also use the proton pump inhibitor omeprazole which is available over-the-counter as Prilosec OTC.     Raised erythematous skin lesion on forehead, will send to dermatology.  He told me the skin lesion on his forehead is been there for maybe 2 years.  It scales up, then sometimes bleeds.  Is not pigmented, but I do think it needs to be checked by dermatology to rule out the possibility of amelanotic melanoma.     Due for age-appropriate average risk colon cancer screening, will pursue Cologuard stool test.       Mild symptoms of benign prostatic hyperplasia, with some urinary bladder frequency, but prostate palpably normal on physical exam, will check PSA today.       Up-to-date on vaccinations except shingles  I advised to get the COVID-19 vaccination when it becomes available to his cohort, I would anticipate February or March.    History of cervical radiculopathic neck pain November  2019 which resolved after administration of a steroid Dosepak.      --------------------------------------------------------------------------------------------------------------------------  History of Present Illness  This 70 y.o. old comes for follow-up and discuss to do issues of constipation and right inguinal hernia    Constipation  Stools hard and dry  I reviewed with him the diet has been using to lose weight, and it sounds pretty healthy with plenty of fiber.  He has experienced constipation in the past when he went on a diet to lose weight.  MiraLAX seem to help a little, but he only took it for a few days.  I told him MiraLAX (polyethylene glycol powder) is safe to use even every day, 1 scoop dissolved in liquid.  It is an osmotic laxative, adds water content to the stools.  He could also use a stool softener capsule such as docusate.  I would generally avoid stimulant laxatives such as senna.    He did have a negative Cologuard test September 22, 2020.  He is never had a colonoscopy.  I told him that it would be quite reasonable to just simply get a colonoscopy in response to a change in stool habit.  He will let me know how his symptoms go, and I would have a low threshold to push him toward a colonoscopy.    Lets check a TSH thyroid cascade today, since last check was in 2018.    9/22/20    COLOGUARD_EXT Negative      Hernia inguinal right  Noticed more as he lost weight  Not painful  Hernia is approximately 3 cm, easily reducible, does not enter the scrotum.  He does not seem to be bothering him too much.  I told him that if it does start to become bothersome, please let me know, then I would send him for general surgery consultation which then might lead to surgical repair which could be done using a laparoscopic robotic technique, as an outpatient procedure, with excellent recovery time.      Wt Readings from Last 3 Encounters:   01/06/21 175 lb 8 oz (79.6 kg)   08/27/20 199 lb 14.4 oz (90.7 kg)    12/17/19 204 lb 11.2 oz (92.9 kg)     BP Readings from Last 3 Encounters:   01/06/21 (!) 150/94   08/27/20 132/80   12/17/19 122/76       Lab Results   Component Value Date    WBC 6.1 08/27/2020    HGB 16.5 08/27/2020    HCT 47.7 08/27/2020     08/27/2020    CHOL 180 08/27/2020    TRIG 74 08/27/2020    HDL 60 08/27/2020    ALT 20 08/27/2020    AST 16 08/27/2020     08/27/2020    K 4.5 08/27/2020     08/27/2020    CREATININE 0.98 08/27/2020    BUN 15 08/27/2020    CO2 27 08/27/2020    TSH 2.87 07/04/2018    PSA 3.6 08/27/2020      ---------------------------------------------------------------------------------------------------------------------------    Medications, Allergies, Social, and Problem List   Current Outpatient Medications   Medication Sig Dispense Refill     aspirin 81 MG EC tablet Take 81 mg by mouth daily.       glucosamine HCl/chondroitin sams (GLUCOSAMINE-CHONDROITIN) 2,000-1,200 mg/30 mL Liqd        ibuprofen (ADVIL,MOTRIN) 200 MG tablet Take 200 mg by mouth every 6 (six) hours as needed for pain.       lisinopriL (PRINIVIL,ZESTRIL) 30 MG tablet Take 1 tablet (30 mg total) by mouth daily. 180 tablet 1     loratadine 5 mg TbDL Take 5 mg by mouth daily.        UNABLE TO FIND Med Name: Tumeric       No current facility-administered medications for this visit.      No Known Allergies  Social History     Tobacco Use     Smoking status: Former Smoker     Smokeless tobacco: Never Used   Substance Use Topics     Alcohol use: Yes     Alcohol/week: 3.0 standard drinks     Types: 3 Glasses of wine per week     Drug use: No     Patient Active Problem List   Diagnosis     Mantoux: positive     Varicose veins of leg with swelling, right     Benign essential hypertension     Benign prostatic hyperplasia with urinary frequency     Obesity     Gastroesophageal reflux disease without esophagitis        Reviewed, reconciled and updated       Physical Exam   General Appearance: Appears well, note  elevated blood pressure today.    BP (!) 150/94 (Patient Site: Right Arm, Patient Position: Sitting, Cuff Size: Adult Regular)   Pulse 60   Temp 97.6  F (36.4  C) (Oral)   Wt 175 lb 8 oz (79.6 kg)   SpO2 96%   BMI 26.60 kg/m      Right-sided inguinal hernia is approximately 3 cm, easily reducible, nontender, does not enter the scrotum.     Additional Information   I spent 25 minutes face time with the patient, with > 50% counseling, explaining and discussing with the patient the issues enumerated in the Assessment and Plan section of this note and answering questions. Afterwards, the patient was given a printout of the AVS, with attention to the content in the Patient Instruction section.       True Mahajan MD

## 2021-06-14 NOTE — PATIENT INSTRUCTIONS - HE
Constipation  Stools hard and dry  I reviewed with him the diet has been using to lose weight, and it sounds pretty healthy with plenty of fiber.  He has experienced constipation in the past when he went on a diet to lose weight.  MiraLAX seem to help a little, but he only took it for a few days.  I told him MiraLAX (polyethylene glycol powder) is safe to use even every day, 1 scoop dissolved in liquid.  It is an osmotic laxative, adds water content to the stools.  He could also use a stool softener capsule such as docusate.  I would generally avoid stimulant laxatives such as senna.    He did have a negative Cologuard test September 22, 2020.  He is never had a colonoscopy.  I told him that it would be quite reasonable to just simply get a colonoscopy in response to a change in stool habit.  He will let me know how his symptoms go, and I would have a low threshold to push him toward a colonoscopy.    Lets check a TSH thyroid cascade today, since last check was in 2018.    9/22/20    COLOGUARD_EXT Negative      Hernia inguinal right  Noticed more as he lost weight  Not painful  Hernia is approximately 3 cm, easily reducible, does not enter the scrotum.  He does not seem to be bothering him too much.  I told him that if it does start to become bothersome, please let me know, then I would send him for general surgery consultation which then might lead to surgical repair which could be done using a laparoscopic robotic technique, as an outpatient procedure, with excellent recovery time.    Benign essential hypertension, blood pressure control has been good on monotherapy with lisinopril 30 mg a day.  For some reason his blood pressure this morning January 6 is a little bit elevated at 150/94.  He gets better readings on his home machine.      Perhaps could increase the lisinopril up to the full dose of 40 mg a day.  I am little surprised to see an elevated blood pressure reading was lost weight successfully.  Let me  know how his numbers behave.    At home gets BP readings about 135/85  lisinopriL (PRINIVIL,ZESTRIL) 30 MG tablet    BP Readings from Last 3 Encounters:   01/06/21 (!) 150/94   08/27/20 132/80   12/17/19 122/76     Successful weight loss  He want to lose 5 pounds more  BMI now 26.6  He feels better    Diet: breakfast yogurt and fruit, biscuit with fiber  Lunch salad  Supper control portion size  No rice, no pasta, No bread    Wt Readings from Last 3 Encounters:   01/06/21 175 lb 8 oz (79.6 kg)   08/27/20 199 lb 14.4 oz (90.7 kg)   12/17/19 204 lb 11.2 oz (92.9 kg)      Gastroesophageal reflux, was getting good results from ranitidine, recommend switch to famotidine.  I told her that famotidine is the generic form of Pepcid.  He can also use the proton pump inhibitor omeprazole which is available over-the-counter as Prilosec OTC.     Raised erythematous skin lesion on forehead, will send to dermatology.  He told me the skin lesion on his forehead is been there for maybe 2 years.  It scales up, then sometimes bleeds.  Is not pigmented, but I do think it needs to be checked by dermatology to rule out the possibility of amelanotic melanoma.     Due for age-appropriate average risk colon cancer screening, will pursue Cologuard stool test.       Mild symptoms of benign prostatic hyperplasia, with some urinary bladder frequency, but prostate palpably normal on physical exam, will check PSA today.       Up-to-date on vaccinations except shingles  I advised to get the COVID-19 vaccination when it becomes available to his cohort, I would anticipate February or March.    History of cervical radiculopathic neck pain November 2019 which resolved after administration of a steroid Dosepak.

## 2021-06-16 PROBLEM — I83.891 VARICOSE VEINS OF LEG WITH SWELLING, RIGHT: Status: ACTIVE | Noted: 2018-07-23

## 2021-06-16 PROBLEM — R76.11 MANTOUX: POSITIVE: Status: ACTIVE | Noted: 2018-07-23

## 2021-06-16 PROBLEM — E66.9 OBESITY: Status: ACTIVE | Noted: 2020-08-27

## 2021-06-16 PROBLEM — I10 BENIGN ESSENTIAL HYPERTENSION: Status: ACTIVE | Noted: 2018-07-23

## 2021-06-16 PROBLEM — K59.01 SLOW TRANSIT CONSTIPATION: Status: ACTIVE | Noted: 2021-01-06

## 2021-06-16 PROBLEM — R35.0 BENIGN PROSTATIC HYPERPLASIA WITH URINARY FREQUENCY: Status: ACTIVE | Noted: 2020-08-27

## 2021-06-16 PROBLEM — N40.1 BENIGN PROSTATIC HYPERPLASIA WITH URINARY FREQUENCY: Status: ACTIVE | Noted: 2020-08-27

## 2021-06-16 PROBLEM — K21.9 GASTROESOPHAGEAL REFLUX DISEASE WITHOUT ESOPHAGITIS: Status: ACTIVE | Noted: 2020-08-27

## 2021-06-17 NOTE — PATIENT INSTRUCTIONS - HE
Patient Instructions by Janet Hernandez NP at 8/23/2019  7:40 AM     Author: Janet Hernandez NP Service: -- Author Type: Nurse Practitioner    Filed: 8/23/2019  7:51 AM Encounter Date: 8/23/2019 Status: Signed    : Janet Hernandez NP (Nurse Practitioner)         Patient Education   Depression and Suicide in Older Adults  Nearly 2 million older Americans have some type of depression. Sadly, some of them even take their own lives. Yet depression among older adults is often ignored. Learn the warning signs. You may help spare a loved one needless pain. You may also save a life.       What Is Depression?  Depression is a mood disorder that affects the way you think and feel. The most common symptom is a feeling of deep sadness. People who are depressed also may seem tired and listless. And nothing seems to give them pleasure. Its normal to grieve or be sad sometimes. But sadness lessens or passes with time. Depression rarely goes away or improves on its own. Other symptoms of depression are:    Sleeping more or less than normal    Eating more or less than normal    Having headaches, stomachaches, or other pains that dont go away    Feeling nervous, empty, or worthless    Crying a great deal    Thinking or talking about suicide or death    Feeling confused or forgetful  What Causes It?  The causes of depression arent fully known. Certain chemicals in the brain play a role. Depression does run in families. And life stresses can also trigger depression in some people. That may be the case with older adults. They often face great burdens, such as the death of friends or a spouse. They may have failing health. And they are more likely to be alone, lonely, or poor.  How You Can Help  Often, depressed people may not want to ask for help. When they do, they may be ignored. Or, they may receive the wrong treatment. You can help by showing parents and older friends love and support. If they seem depressed, help them  Patient returns call states he is not taking the Inspra due to cost please advise.    find the right treatment. Talk to your doctor. Or contact a local mental health center, social service agency, or hospital. With modern treatment, no one has to suffer from depression.  Resources:    National [place] of Mental Health  875.308.8986  www.nimh.nih.gov    National [place] on Mental Illness  607.848.9539  www.wil.org    [facility] [place]  887.594.6283  www.[facility].org    National Suicide Hotline  963.216.4895 (800-SUICIDE)      5718-7566 [employer]. [address] [place] [address]. All rights reserved. This information is not intended as a substitute for professional medical care. Always follow your healthcare professional's instructions.         Patient Education   Understanding Advance Care Planning  Advance care planning is the process of deciding ones own future medical care. It helps ensure that if you cant speak for yourself, your wishes can still be carried out. The plan is a series of legal documents that note a persons wishes. The documents vary by state. Advance care planning may be done when a person has a serious illness that is expected to get worse. It may be done before major surgery. And it can help you and your family be prepared in case of a major illness or injury. Advance care planning helps with making decisions at these times.       A health care proxy is a person who acts as the voice of a patient when the patient cant speak for himself or herself. The name of this role varies by state. It may be called a Durable Medical Power of  or Durable Power of  for Healthcare. It may be called an agent, surrogate, or advocate. Or it may be called a representative or decision maker. It is an official duty that is identified by a legal document. The document also varies by state.    Why Is Advance Care Planning Important?  If a person communicates their healthcare wishes:    They will be given medical care that matches their values and  goals.    Their family members will not be forced to make decisions in a crisis with no guidance.  Creating a Plan  Making an advance care plan is often done in 3 steps:    Thinking about ones wishes. To create an advance care plan, you should think about what kind of medical treatment you would want if you lose the ability to communicate. Are there any situations in which you would refuse or stop treatment? Are there therapies you would want or not want? And whom do you want to make decisions for you? There are many places to learn more about how to plan for your care. Ask your doctor or  for resources.    Picking a health care proxy. This means choosing a trusted person to speak for you only when you cant speak for yourself. When you cannot make medical decisions, your proxy makes sure the instructions in your advance care plan are followed. A proxy does not make decisions based on his or her own opinions. They must put aside those opinions and values if needed, and carry out your wishes.    Filling out the legal documents. There are several kinds of legal documents for advance care planning. Each one tells health care providers your wishes. The documents may vary by state. They must be signed and may need to be witnessed or notarized. You can cancel or change them whenever you wish. Depending on your state, the documents may include a Healthcare Proxy form, Living Will, Durable Medical Power of , Advance Directive, or others.  The Familys Role  The best help a family can give is to support their loved ones wishes. Open and honest communication is vital. Family should express any concerns they have about the patients choices while the patient can still make decisions.    7403-7566 [employer]. [address] [place] [address], [place] [address]. All rights reserved. This information is not intended as a substitute for professional medical care. Always follow your healthcare professional's  instructions.         Also, [facility] offers a free, downloadable health care directive that allows you to share your treatment choices and personal preferences if you cannot communicate your wishes. It also allows you to appoint another person (called a health care agent) to make health care decisions if you are unable to do so. You can download an advance directive by going here: [email or web address]     Patient Education   Personalized Prevention Plan  You are due for the preventive services outlined below.  Your care team is available to assist you in scheduling these services.  If you have already completed any of these items, please share that information with your care team to update in your medical record.  Health Maintenance   Topic Date Due   ? HEPATITIS C SCREENING  1950   ? COLONOSCOPY  01/12/2000   ? ZOSTER VACCINES (1 of 2) 01/12/2000   ? TD 18+ HE  09/08/2002   ? PNEUMOCOCCAL POLYSACCHARIDE VACCINE AGE 65 AND OVER  01/12/2015   ? FALL RISK ASSESSMENT  07/09/2019   ? MEDICARE ANNUAL WELLNESS VISIT  07/23/2019   ? INFLUENZA VACCINE RULE BASED (1) 08/01/2019   ? ADVANCE CARE PLANNING  07/23/2023   ? PNEUMOCOCCAL CONJUGATE VACCINE FOR ADULTS (PCV13 OR PREVNAR)  Completed

## 2021-06-18 NOTE — PATIENT INSTRUCTIONS - HE
Patient Instructions by Yolanda Shi CMA at 8/27/2020  8:40 AM     Author: Yolanda Shi CMA Service: -- Author Type: Certified Medical Assistant    Filed: 8/27/2020  9:22 AM Encounter Date: 8/27/2020 Status: Addendum    : True Mahajan MD (Physician)    Related Notes: Original Note by Yolanda Shi CMA (Certified Medical Assistant) filed at 8/27/2020  8:47 AM       Annual wellness visit for this 70-year-old man, retired [employer], who has been enjoying good health over the last year.  Issues are benign essential hypertension, with good blood pressure control on monotherapy with lisinopril.  Overweight with body mass index of 30.3, he really does need to lose 25 to 30 pounds over the next year.  Gastroesophageal reflux, was getting good results from ranitidine, recommend switch to famotidine.  Raised erythematous skin lesion on forehead, will send to dermatology.  Due for age-appropriate average risk colon cancer screening, will pursue Cologuard stool test.  Mild symptoms of benign prostatic hyperplasia, with some urinary bladder frequency, but prostate palpably normal on physical exam, will check PSA today.  Up-to-date on vaccinations except shingles, and also he should get annual flu vaccine.  History of cervical radiculopathic neck pain November 2019 which resolved after administration of a steroid Dosepak.    He is fasting this morning, so we will get comprehensive metabolic panel, blood cell counts, lipid profile, and screening PSA.  Cologuard is ordered.    Going issue by issue:    Benign essential hypertension, with good blood pressure control on monotherapy with lisinopril.  Today's in clinic blood pressure of 132/80 is quite good.  He showed me his home blood pressure readings, and most systolic readings are in the 120s, most diastolics in the 70s.  He does need to lose some weight, which would be good for blood pressure.  He did not need to start amlodipine.  He told me that  alcohol consumption is approximately less than 1 drink per day, typically a beer or 1 glass of wine.  That does not sound excessive.  It does have some calories however.  I do not think that degree of alcohol consumption would raise blood pressure.  He snores, but he is quite sure he does not have obstructive sleep apnea.    Overweight with body mass index of 30.3, he really does need to lose 25 to 30 pounds over the next year.  For weight control, I reminded him about the importance of portion control, eating slower, and identifying problem foods to curtail or eliminate.  Even 1 serving of alcohol per day brings may be 2 or 300 carbohydrate calories.  He enjoys walking and biking.  I would encourage her to add some strength training.  Strengthening the muscles of his upper back might also be good for his neck.    Gastroesophageal reflux, was getting good results from ranitidine, recommend switch to famotidine.  I told her that famotidine is the generic form of Pepcid.  He can also use the proton pump inhibitor omeprazole which is available over-the-counter as Prilosec OTC.    Raised erythematous skin lesion on forehead, will send to dermatology.  He told me the skin lesion on his forehead is been there for maybe 2 years.  It scales up, then sometimes bleeds.  Is not pigmented, but I do think it needs to be checked by dermatology to rule out the possibility of amelanotic melanoma.    Due for age-appropriate average risk colon cancer screening, will pursue Cologuard stool test.      Mild symptoms of benign prostatic hyperplasia, with some urinary bladder frequency, but prostate palpably normal on physical exam, will check PSA today.      Up-to-date on vaccinations except shingles, and also he should get annual flu vaccine.     History of cervical radiculopathic neck pain November 2019 which resolved after administration of a steroid Dosepak.        Patient Education   Understanding Advance Care Planning  Advance care  planning is the process of deciding ones own future medical care. It helps ensure that if you cant speak for yourself, your wishes can still be carried out. The plan is a series of legal documents that note a persons wishes. The documents vary by state. Advance care planning may be done when a person has a serious illness that is expected to get worse. It may be done before major surgery. And it can help you and your family be prepared in case of a major illness or injury. Advance care planning helps with making decisions at these times.       A health care proxy is a person who acts as the voice of a patient when the patient cant speak for himself or herself. The name of this role varies by state. It may be called a Durable Medical Power of  or Durable Power of  for Healthcare. It may be called an agent, surrogate, or advocate. Or it may be called a representative or decision maker. It is an official duty that is identified by a legal document. The document also varies by state.    Why Is Advance Care Planning Important?  If a person communicates their healthcare wishes:    They will be given medical care that matches their values and goals.    Their family members will not be forced to make decisions in a crisis with no guidance.  Creating a Plan  Making an advance care plan is often done in 3 steps:    Thinking about ones wishes. To create an advance care plan, you should think about what kind of medical treatment you would want if you lose the ability to communicate. Are there any situations in which you would refuse or stop treatment? Are there therapies you would want or not want? And whom do you want to make decisions for you? There are many places to learn more about how to plan for your care. Ask your doctor or  for resources.    Picking a health care proxy. This means choosing a trusted person to speak for you only when you cant speak for yourself. When you cannot make medical  decisions, your proxy makes sure the instructions in your advance care plan are followed. A proxy does not make decisions based on his or her own opinions. They must put aside those opinions and values if needed, and carry out your wishes.    Filling out the legal documents. There are several kinds of legal documents for advance care planning. Each one tells health care providers your wishes. The documents may vary by state. They must be signed and may need to be witnessed or notarized. You can cancel or change them whenever you wish. Depending on your state, the documents may include a Healthcare Proxy form, Living Will, Durable Medical Power of , Advance Directive, or others.  The Familys Role  The best help a family can give is to support their loved ones wishes. Open and honest communication is vital. Family should express any concerns they have about the patients choices while the patient can still make decisions.    [phone] [employer]. [address] [place] [address]. All rights reserved. This information is not intended as a substitute for professional medical care. Always follow your healthcare professional's instructions.         Also, [facility] [employer] offers a free, downloadable health care directive that allows you to share your treatment choices and personal preferences if you cannot communicate your wishes. It also allows you to appoint another person (called a health care agent) to make health care decisions if you are unable to do so. You can download an advance directive by going here: [email or web address]     Patient Education   Personalized Prevention Plan  You are due for the preventive services outlined below.  Your care team is available to assist you in scheduling these services.  If you have already completed any of these items, please share that information with your care team to update in your medical record.  [facility]  Maintenance   Topic Date Due   ? ZOSTER VACCINES (1 of 2) 01/12/2000   ? PNEUMOCOCCAL IMMUNIZATION 65+ LOW/MEDIUM RISK (1 of 2 - PCV13) 01/12/2015   ? INFLUENZA VACCINE RULE BASED (1) 08/01/2020   ? FALL RISK ASSESSMENT  08/23/2020   ? MEDICARE ANNUAL WELLNESS VISIT  08/23/2020   ? COLORECTAL CANCER SCREENING  09/03/2020   ? LIPID  08/23/2024   ? ADVANCE CARE PLANNING  08/23/2024   ? TD 18+ HE  08/23/2029   ? HEPATITIS C SCREENING  Completed   ? HEPATITIS B VACCINES  Aged Out

## 2021-06-19 NOTE — PROGRESS NOTES
"Chief Complaint   Patient presents with     Panic Attack     started around noon         HPI    Patient is here because of a \"panic attack\" episode that occurred around noon today. While sitting on a chair in his house and listening to the news of the children lost in the cave in [place] he was envisioning being in the cave and all of a sudden felt anxious, sweaty, and generally not feeling well. He checked his pulse and it was 90, which was higher than usual. He checked his blood pressure at home and it was in the 180s. He has claustrophobia and has had about several episodes the last 10 yrs. Currently he is feeling at baseline.     Another reason patient came in is because his BP was elevated. No hx of hypertension. But he has not checked his BP for years.    ROS: Pertinent ROS noted in HPI.     No Known Allergies    There is no problem list on file for this patient.      No family history on file.    Social History     Social History     Marital status:      Spouse name: N/A     Number of children: N/A     Years of education: N/A     Occupational History     Not on file.     Social History Main Topics     Smoking status: Former Smoker     Smokeless tobacco: Never Used     Alcohol use Not on file     Drug use: Not on file     Sexual activity: Not on file     Other Topics Concern     Not on file     Social History Narrative     No narrative on file     Objective:    Vitals:    07/03/18 1511 07/03/18 1540   BP: (!) 166/100 (!) 170/104   Patient Site: Right Arm    Patient Position: Sitting    Cuff Size: Adult Regular    Pulse: 76    Resp: 16    Temp: 98.2  F (36.8  C)    TempSrc: Oral    SpO2: 96%    Weight: 198 lb 6 oz (90 kg)        Gen: well appearing  CV: RRR, normal S1S2, no M, R, G  Pulm: CTAB, normal effort  Psych: normal affect, good eye contact.     Assessment/Plan:    #1. Elevated BP - no hx of hypertension. F/u with primary care in 2 days (scheduled by staff).   #2. Claustrophobia - reassuring " exam. Symptoms much improved. CRISTINA score = 4 today.

## 2021-06-19 NOTE — PROGRESS NOTES
Assessment/Plan:     1. Hypertension  Blood pressure continues to be elevated.  Will increase Lisinopril to 20 mg once daily.  Continue follow up with me in 2 weeks.  Will recheck a BMP at that time.   - lisinopril (PRINIVIL,ZESTRIL) 20 MG tablet; Take 1 tablet (20 mg total) by mouth daily.  Dispense: 30 tablet; Refill: 0    2. Anxiety  Patient appears moderately anxious.  I do think there is some correlation with his new onset (or revealed) hypertension.  Discussed treatment of acute anxiety including CBT, daily medication, and PRN medication.  Symptoms are very new.  Will refer for CBT.  Start hydroxyzine three times a day as needed or acute anxiety symptoms.  Discussed proper use and possible side effects of medication.  Encouraged regular activity, deep breathing, and relaxation activities.    - Ambulatory referral to Psychology  - hydrOXYzine HCl (ATARAX) 25 MG tablet; Take 1-2 tablets (25-50 mg total) by mouth 3 (three) times a day as needed for anxiety.  Dispense: 30 tablet; Refill: 0        Subjective:     Roland Dejesus is a 68 y.o. male accompanied by his wife who presents with worsening anxiety.  Patient has recently been diagnosed with hypertension.  He presented to the ED and walk-in care initially due to anxiety and elevated blood pressures.  Patient has since been started on lisinopril, 10 mg daily presents today with a blood pressure log.  Blood pressures ranging from 160//74.  He feels that he is tolerating the lisinopril well.  Patient is having significant anxiety.  States he feels extremely unsettled, and is pacing the house.  His appetite is poor and he is having much difficulty calming down.  Wife states patient is very active.  He is cleaning the house, pacing, and unable to sit still.  This is very abnormal for him.  He has not been sleeping well at night secondary to anxiety.  He cannot really pinpoint any specific worry.  Symptoms started when he watched coverage of voice stuck in a  "cave.  Patient states the ear feels \"stuffy\" is starting to have some new depressive symptoms secondary to the anxiety patient denies any suicidal ideations.  No chest pain or shortness of breath.  He is having some mild nausea and abdominal discomfort, which he associates with anxiety.  Since starting the lisinopril, he has also had some constipation and feels that he has a hard time starting his urine stream.  These are both new symptoms for him.      The following portions of the patient's history were reviewed and updated as appropriate: allergies, current medications, past family history, past medical history, past social history, past surgical history and problem list.    Review of Systems  A comprehensive review of systems was performed and was otherwise negative    Objective:     BP (!) 168/102 (Patient Site: Right Arm, Patient Position: Sitting, Cuff Size: Adult Regular)  Pulse 92  Ht 5' 7.75\" (1.721 m)  Wt 195 lb (88.5 kg)  BMI 29.87 kg/m2    General Appearance: Alert, cooperative, no distress, appears stated age, teary.   Lungs: Clear to auscultation bilaterally, respirations unlabored  Heart: Regular rate and rhythm, S1 and S2 normal, no murmur, rub, or gallop  Psychologic: appropriate affective, answers all of my questions appropriately. No hallucinations, delusion, or suicidal ideations.    PHQ-9 Total Score: 15 (7/9/2018 10:00 AM)  CRISTINA-7 Total Score: 17 (7/9/2018 10:00 AM)    PABLITO Guillen    "

## 2021-06-19 NOTE — PROGRESS NOTES
"Assessment/Plan:     1. Hypertension  Blood pressure improved today.  Recommend continuing lisinopril, 10 mg once daily.  Possibly secondary to anxiety, but I do feel his blood pressure could be better controlled.  Patient will continue checking blood pressures every 1-2 days, and will keep a log for me.  Follow-up in 2-3 weeks for blood pressure and lab recheck.  I did encourage him to come fasting, so we can check some other screening labs at that time.  He will certainly notify me if he is having any problems sooner.  - lisinopril (PRINIVIL,ZESTRIL) 10 MG tablet; Take 1 tablet (10 mg total) by mouth daily.  Dispense: 30 tablet; Refill: 0    2. Anxiety      Subjective:     Roland Dejesus is a 68 y.o. male accompanied by his wife who presents for follow-up regarding recent WIC and ED visit.  Patient presented to walk-in care on 7/3 complaints of elevated blood pressure and anxiety.  Apparently, patient had been watching coverage of the boys stuck in a cave in [facility], which induced anxiety and claustrophobia.  He does not have a history of anxiety, but does struggle with claustrophobia.  Patient states he started feeling nauseated, anxious, and uncomfortable.  He took his blood pressure at home, and had an elevated reading.  No history of hypertension, although he does not regularly follow with any provider.  Patient was discharged from walk-in care with an appointment with primary care.  States he started feeling anxious again on 7/4, and presented to the ED.  Patient underwent a workup in the ED, which was unremarkable.  His blood pressure continued to be elevated, and he was discharged on lisinopril.  Patient has taken 2 doses of the lisinopril.  His blood pressures at home have been 116/66 and 121/66.  He has been feeling much better since discharge from the ED.  He does have some slight nausea and abdominal \"tightness\", but no chest pain, shortness of breath, or headache.  He has not been feeling " particularly anxious.  He is having some depressive thoughts, as he is under a lot of stress due to commitments at [employer].  Patient is a retired .      The following portions of the patient's history were reviewed and updated as appropriate: allergies, current medications, past family history, past medical history, past social history, past surgical history and problem list.    Review of Systems  A comprehensive review of systems was performed and was otherwise negative    Objective:     /80 (Patient Site: Right Arm, Patient Position: Sitting, Cuff Size: Adult Regular)  Pulse 86  Temp 98.2  F (36.8  C) (Oral)   Wt 197 lb 14.4 oz (89.8 kg)    General Appearance: Alert, cooperative, no distress, appears stated age  Lungs: Clear to auscultation bilaterally, respirations unlabored  Heart: Regular rate and rhythm, S1 and S2 normal, no murmur, rub, or gallop. No LE edema.  Psychologic: appropriate affective, answers all of my questions appropriately. No hallucinations, delusion, or suicidal ideations.    PABLITO Guillen

## 2021-06-19 NOTE — PROGRESS NOTES
Assessment/Plan:     1. Routine health maintenance    2. Screen for colon cancer  - Ambulatory referral for Colonoscopy    3. Benign essential hypertension  Blood pressure elevated in clinic today.  Home readings are acceptable.  Recommend increasing Lisinopril to 40 mg once daily.  Patient would like to start with 30 mg due to anxiety.  Agreeable to this.  He will continue to check BPs.  If they continue to be > 140/90 in the next 2-3 weeks, then will increase to 40 mg.  CMP pending.   - Comprehensive Metabolic Panel; Future  - lisinopril (PRINIVIL,ZESTRIL) 10 MG tablet; Take 1 tablet (10 mg total) by mouth daily. Take with 20 mg tablet - for a total of 30 mg daily.  Dispense: 30 tablet; Refill: 0  - lisinopril (PRINIVIL,ZESTRIL) 20 MG tablet; Take 1 tablet (20 mg total) by mouth daily.  Dispense: 30 tablet; Refill: 0    4. Encounter for screening for malignant neoplasm of prostate   - PSA, Annual Screen (Prostatic-Specific Antigen); Future    5. Screening for lipid disorders  - Lipid [place] FASTING; Future    6. Anxiety  Anxiety slightly improved. He will continue CBT and relaxation methods.  Discussed a daily medication such as an SSRI, but patient not interested at this time.      7. GERD (gastroesophageal reflux disease)  Continue Zantac twice daily.  Discussed dietary modifications to make as well.  If not improving, consider Omeprazole.         Subjective:   Roland Dejesus  is a 68 y.o. male accompanied by his wife who presents for an annual exam.    Hypertension: Patient recently diagnosed with hypertension.  He is currently on lisinopril, 20 mg once daily.  He is tolerating this well.  He brings in blood pressure readings from home, which show readings ranging from 124/70 - 151/84.  Pulse ranges from 70-56.  Patient denies any chest pain, shortness of breath, palpitations, or dry cough.    Anxiety: Since our last visit, patient has established with a counselor.  He is questioning whether this will be  "helpful, but he does have a follow-up appointment in 3 weeks.  He has started doing some guided meditation at home and relaxation audio.  Patient tried the hydroxyzine that I prescribed him at last visit, but he did not like how it made him feel.  States he felt \"fuzzy\", and made it difficult for him to urinate.  The symptoms completely resolved once he stopped the medication.  Patient does have some frequent nighttime urination, but this is normal for him.  He generally does not have a hard time starting or stopping his urine stream.  Anxiety has been somewhat improved.  Wife tells me he he still has some pacing at home in the afternoon.  He has recently started acupuncture as well.    Patient reports stomach upset at night.  He is associating this with acid reflux.  He really only notices symptoms when he lays flat.  Endorses upper abdominal discomfort.  He started taking Zantac, 75 mg twice daily.  Symptoms completely resolved with this.  He backed off of the medication, and his symptoms returned.  He has since returned twice daily dosing.    Patient is due for a colonoscopy.  No history of screening. Denies any blood in his stools.    Patient declines immunizations today.  Due for Zoster, Pneumonia, and Td.    Healthy Habits:   Regular Exercise: yes  Sunscreen Use: yes  Healthy Diet: yes  Dental Visits Regularly: yes  Seat Belt: yes  Sexually active: yes  Hemoccults: na  Flex Sig: na  Colonoscopy: na  Lipid Profile: na  Glucose Screen: yes    Immunization History   Administered Date(s) Administered     Hep A, Adult IM (19yr & older) 12/02/1995     Hep A, historic 08/18/1994, 09/20/1994     Hep B, historic 06/02/1994, 07/14/1994, 12/28/1994     IPV 09/08/1992     Immune Globulin (Ig) Received 09/08/1992, 02/02/1993, 07/28/1993, 06/02/1994     Td,adult,historic,unspecified 09/08/1992     Typhoid, historic, Unspecified 07/14/1994       Immunization status: up to date and documented.    Current Outpatient " Prescriptions   Medication Sig Dispense Refill     aspirin 81 MG EC tablet Take 81 mg by mouth daily.       cholecalciferol, vitamin D3, (VITAMIN D3) 1,000 unit capsule Take 1,000 Units by mouth daily.       ibuprofen (ADVIL,MOTRIN) 200 MG tablet Take 200 mg by mouth every 6 (six) hours as needed for pain.       lisinopril (PRINIVIL,ZESTRIL) 10 MG tablet Take 1 tablet (10 mg total) by mouth daily. Take with 20 mg tablet - for a total of 30 mg daily. 30 tablet 0     loratadine 5 mg TbDL Take 5 mg by mouth daily.        ranitidine (ZANTAC) 75 MG tablet Take 75 mg by mouth 2 (two) times a day.        lisinopril (PRINIVIL,ZESTRIL) 20 MG tablet Take 1 tablet (20 mg total) by mouth daily. 30 tablet 0     No current facility-administered medications for this visit.        Past Medical History:   Diagnosis Date     Anxiety      Past Surgical History:   Procedure Laterality Date     KNEE ARTHROSCOPY Right      VARICOSE VEIN SURGERY        No Known Allergies  Family History   Problem Relation Age of Onset     COPD Mother      Heart disease Mother      Heart failure Mother      Parkinsonism Father      Aneurysm Brother      Prostate cancer Brother      Anxiety disorder Brother      Depression Brother      Alcohol abuse Brother      Drug abuse Brother      Social History     Social History     Marital status:      Spouse name: N/A     Number of children: N/A     Years of education: N/A     Occupational History     Not on file.     Social History Main Topics     Smoking status: Former Smoker     Smokeless tobacco: Never Used     Alcohol use 1.8 oz/week     3 Glasses of wine per week     Drug use: No     Sexual activity: Yes     Partners: Female     Other Topics Concern     Not on file     Social History Narrative        Review of Systems  Fourteen point review of systems negative except as mentioned in HPI    Objective:      Vitals:    07/23/18 0747   BP: (!) 154/102   Patient Site: Right Arm   Patient Position: Sitting  "  Cuff Size: Adult Regular   Pulse: 64   Weight: 191 lb (86.6 kg)   Height: 5' 7.75\" (1.721 m)     Body mass index is 29.26 kg/(m^2).    Physical Exam:  General Appearance: Alert, cooperative, no distress, appears stated age  Head: Normocephalic, without obvious abnormality, atraumatic  Eyes: PERRL, conjunctiva/corneas clear, EOM's intact. Wearing corrective glasses.   Ears: Normal TM's and external ear canals, both ears  Nose: Nares normal, septum midline,mucosa normal, no drainage  Throat: Lips, mucosa, and tongue normal; teeth and gums normal. Normal pharynx.  Neck: Supple, symmetrical, trachea midline, no adenopathy;  thyroid: not enlarged, symmetric, no tenderness/mass/nodules; no carotid bruit or JVD  Back: Symmetric, no curvature, ROM normal, no CVA tenderness  Lungs: Clear to auscultation bilaterally, respirations unlabored  Heart: Regular rate and rhythm, S1 and S2 normal, no murmur, rub, or gallop   Abdomen: Soft, non-tender, bowel sounds active all four quadrants,  no masses, no organomegaly  Extremities: Extremities normal, atraumatic, no cyanosis or edema  Skin: Skin color, texture, turgor normal, no rashes or lesions  Lymph nodes: Cervical, supraclavicular, and axillary nodes normal  Neurologic: Normal   Psychologic: appropriate affective, answers all of my questions appropriately. No hallucinations, delusion, or suicidal ideations.    PHQ-9 Total Score: 13 (7/23/2018  8:00 AM)  CRISTINA-7 Total Score: 14 (7/23/2018  8:00 AM)    PABLITO Guillen    "

## 2021-06-19 NOTE — PROGRESS NOTES
Assessment/Plan:     1. Anxiety  Stable with counseling.  GAD7 is down to 4.  Will continue with counseling.      2. Benign essential hypertension  Blood pressure stable on Lisinopril.  Continue at current dose.    - lisinopril (PRINIVIL,ZESTRIL) 30 MG tablet; Take 1 tablet (30 mg total) by mouth daily.  Dispense: 90 tablet; Refill: 3    3. Constipation  Recommend starting a stool softener such as Docusate Colace once daily for hard stools.  May also consider prune juice.  Continue fiber supplement.     4. Sleep disturbance  Discussed causes and treatments of sleep disturbance.  I do think anxiety is playing a larger part in this thin patient realizes.  I do not think this is from his Lisinopril, but he is concerned given that it is on the list of possible side effects.  We discussed trialing his lisinopril in the evening versus the morning.  He will start this.  Patient will continue with melatonin.  Discussed trialing him on trazodone, but he is hesitant to start this.  Does not appear to have symptoms of obstructive sleep apnea, but can consider a sleep medicine referral in the future.  Patient will follow-up with me if you would like to discuss trazodone.  This may also help with anxiety.        Subjective:     Roland Dejesus is a 68 y.o. male who presents for follow-up regarding several issues.    Anxiety: Patient previously seen with significant anxious episodes.  He is currently seeing a counselor, and feels that anxiety has been better.  He has previously declined any medication management, and does not wish to initiate any at this time.    Hypertension: Patient currently on lisinopril, 30 mg once daily.  He brings in a log of his blood pressures.  Pressures are generally in the 120s over 70s.  Specifically range from 112/70 to 144/79.  Patient feels that he is tolerating the lisinopril well.  He is concerned that it is causing some sleep issues and constipation.    Patient reports difficulty with sleep.   He is currently taking 0.3 mg of melatonin at night.  This is helping him fall asleep, but he is waking up between 1 and 2 AM with significant difficulty falling asleep.  It will usually take him 1-2 hours to fall back asleep.  He had initially trialed himself on a higher dose of melatonin, but did not like how it made him feel.  Patient does not believe he snores.  He denies any significant daytime fatigue or frequent awakening at night.  He does notes that when he wakes up he has some anxiety.    Patient reports hard stools and straining to have a bowel movement.  He is having 1-2 bowel movements per day.  He does notice that they are a little bit harder and sometimes difficult to initiate.  No rectal pain, bleeding, or blood in his stools.  He denies any abdominal pain.  He is recently started a fiber supplement, which has been somewhat helpful.  Patient has lost some weight over the last month, but his appetite has not been affected by his anxiety.  His appetite is getting better.    Patient is requesting a letter today.  He recently canceled a trip he had planned for next month due to his anxiety.  He does not feel that he can travel internationally at this time.      The following portions of the patient's history were reviewed and updated as appropriate: allergies, current medications, past family history, past medical history, past social history, past surgical history and problem list.    Review of Systems  A comprehensive review of systems was performed and was otherwise negative    Objective:     /70 (Patient Site: Right Arm, Patient Position: Sitting, Cuff Size: Adult Regular)  Pulse 78  Temp 98.5  F (36.9  C) (Oral)   Wt 187 lb (84.8 kg)  BMI 28.64 kg/m2    General Appearance: Alert, cooperative, no distress, appears stated age  Lungs: Clear to auscultation bilaterally, respirations unlabored  Heart: Regular rate and rhythm, S1 and S2 normal, no murmur, rub, or gallop   Abdomen: Soft,  non-tender, bowel sounds active all four quadrants,  no masses, no organomegaly      PHQ-9 Total Score: 8 (8/7/2018  9:00 AM)  CRISTINA-7 Total Score: 4 (8/7/2018  9:00 AM)    Time: total time spent with the patient was 30 minutes of which >50% was spent in counseling and coordination of care      PABLITO Guillen

## 2021-06-19 NOTE — LETTER
Letter by Janet Hernandez NP at      Author: Janet Hernandez NP Service: -- Author Type: --    Filed:  Encounter Date: 8/8/2019 Status: (Other)         Roland Dejesus  [address] [facility] [address] [facility] [address]             August 8, 2019         Dear Mr. Dejesus,    Our records show that you are due for a colonoscopy.  We do want to inform you that there are a couple of other options besides the traditional colonoscopy that we offer.  If you would like to do the traditional colonoscopy, please contact a [facility] near you according to the card enclosed.  If you would like to do one of the other options available to you, please let you primary doctor know and they will get that ordered.  Enclosed is some information on the other options for you to read over.  If you have had any of these done at another facility, please arrange for us to receive those records so we can update your chart.    Please call with questions or contact us using [employer].    Sincerely,        Electronically signed by Janet Hernandez NP

## 2021-06-26 ENCOUNTER — HEALTH MAINTENANCE LETTER (OUTPATIENT)
Age: 71
End: 2021-06-26

## 2021-09-04 ASSESSMENT — ACTIVITIES OF DAILY LIVING (ADL): CURRENT_FUNCTION: NO ASSISTANCE NEEDED

## 2021-09-07 ENCOUNTER — OFFICE VISIT (OUTPATIENT)
Dept: INTERNAL MEDICINE | Facility: CLINIC | Age: 71
End: 2021-09-07
Payer: COMMERCIAL

## 2021-09-07 VITALS
DIASTOLIC BLOOD PRESSURE: 78 MMHG | HEIGHT: 68 IN | SYSTOLIC BLOOD PRESSURE: 132 MMHG | HEART RATE: 55 BPM | WEIGHT: 177 LBS | BODY MASS INDEX: 26.83 KG/M2 | OXYGEN SATURATION: 98 %

## 2021-09-07 DIAGNOSIS — R35.0 BENIGN PROSTATIC HYPERPLASIA WITH URINARY FREQUENCY: ICD-10-CM

## 2021-09-07 DIAGNOSIS — I83.891 VARICOSE VEINS OF LEG WITH SWELLING, RIGHT: ICD-10-CM

## 2021-09-07 DIAGNOSIS — N40.1 BENIGN PROSTATIC HYPERPLASIA WITH URINARY FREQUENCY: ICD-10-CM

## 2021-09-07 DIAGNOSIS — I10 BENIGN ESSENTIAL HYPERTENSION: ICD-10-CM

## 2021-09-07 DIAGNOSIS — N40.2 PROSTATE NODULE WITHOUT URINARY OBSTRUCTION: ICD-10-CM

## 2021-09-07 DIAGNOSIS — Z00.00 ROUTINE GENERAL MEDICAL EXAMINATION AT A HEALTH CARE FACILITY: Primary | ICD-10-CM

## 2021-09-07 DIAGNOSIS — Z12.5 SCREENING PSA (PROSTATE SPECIFIC ANTIGEN): ICD-10-CM

## 2021-09-07 LAB
ALBUMIN SERPL-MCNC: 4 G/DL (ref 3.5–5)
ALP SERPL-CCNC: 54 U/L (ref 45–120)
ALT SERPL W P-5'-P-CCNC: 21 U/L (ref 0–45)
ANION GAP SERPL CALCULATED.3IONS-SCNC: 12 MMOL/L (ref 5–18)
AST SERPL W P-5'-P-CCNC: 18 U/L (ref 0–40)
BILIRUB SERPL-MCNC: 1.8 MG/DL (ref 0–1)
BUN SERPL-MCNC: 17 MG/DL (ref 8–28)
CALCIUM SERPL-MCNC: 10 MG/DL (ref 8.5–10.5)
CHLORIDE BLD-SCNC: 99 MMOL/L (ref 98–107)
CHOLEST SERPL-MCNC: 177 MG/DL
CO2 SERPL-SCNC: 26 MMOL/L (ref 22–31)
CREAT SERPL-MCNC: 0.92 MG/DL (ref 0.7–1.3)
ERYTHROCYTE [DISTWIDTH] IN BLOOD BY AUTOMATED COUNT: 12.9 % (ref 10–15)
FASTING STATUS PATIENT QL REPORTED: YES
GFR SERPL CREATININE-BSD FRML MDRD: 83 ML/MIN/1.73M2
GLUCOSE BLD-MCNC: 101 MG/DL (ref 70–125)
HCT VFR BLD AUTO: 45.7 % (ref 40–53)
HDLC SERPL-MCNC: 83 MG/DL
HGB BLD-MCNC: 15.4 G/DL (ref 13.3–17.7)
LDLC SERPL CALC-MCNC: 84 MG/DL
MCH RBC QN AUTO: 31.1 PG (ref 26.5–33)
MCHC RBC AUTO-ENTMCNC: 33.7 G/DL (ref 31.5–36.5)
MCV RBC AUTO: 92 FL (ref 78–100)
PLATELET # BLD AUTO: 179 10E3/UL (ref 150–450)
POTASSIUM BLD-SCNC: 4.1 MMOL/L (ref 3.5–5)
PROT SERPL-MCNC: 6.7 G/DL (ref 6–8)
PSA SERPL-MCNC: 5.03 UG/L (ref 0–6.5)
RBC # BLD AUTO: 4.95 10E6/UL (ref 4.4–5.9)
SODIUM SERPL-SCNC: 137 MMOL/L (ref 136–145)
TRIGL SERPL-MCNC: 52 MG/DL
WBC # BLD AUTO: 5.8 10E3/UL (ref 4–11)

## 2021-09-07 PROCEDURE — 80061 LIPID PANEL: CPT | Performed by: INTERNAL MEDICINE

## 2021-09-07 PROCEDURE — 85027 COMPLETE CBC AUTOMATED: CPT | Performed by: INTERNAL MEDICINE

## 2021-09-07 PROCEDURE — 36415 COLL VENOUS BLD VENIPUNCTURE: CPT | Performed by: INTERNAL MEDICINE

## 2021-09-07 PROCEDURE — G0103 PSA SCREENING: HCPCS | Performed by: INTERNAL MEDICINE

## 2021-09-07 PROCEDURE — 80053 COMPREHEN METABOLIC PANEL: CPT | Performed by: INTERNAL MEDICINE

## 2021-09-07 PROCEDURE — 99397 PER PM REEVAL EST PAT 65+ YR: CPT | Performed by: INTERNAL MEDICINE

## 2021-09-07 RX ORDER — ACETAMINOPHEN 500 MG
500-1000 TABLET ORAL EVERY 6 HOURS PRN
COMMUNITY

## 2021-09-07 ASSESSMENT — ACTIVITIES OF DAILY LIVING (ADL): CURRENT_FUNCTION: NO ASSISTANCE NEEDED

## 2021-09-07 ASSESSMENT — MIFFLIN-ST. JEOR: SCORE: 1532.37

## 2021-09-07 NOTE — PATIENT INSTRUCTIONS
Annual wellness visit, overall doing well since her last in person meeting of January 2021  There is a new issue we are looking into today which is     Small prostate nodule on the left side which I palpated today September 7, estimated 2 mm, will check PSA and refer to urology    For lab work will get PSA, fasting lipid panel, comprehensive metabolic panel, and blood cell counts.  He feels well overall, which is important.    Constipation-- not a problem now  Past Stools hard and dry  MiraLAX seem to help     Negative Cologuard test September 22, 2020, good for 3 years.  He is never had a colonoscopy.   COLOGUARD_EXT Negative     Hernia inguinal right not too bothersome, size approximate 3-4 cm, easily reducible  Noticed more as he lost weight  Hernia is approximately 3-4 cm, easily reducible, does not enter the scrotum.    He does not seem to be bothering him too much.  I told him that if it does start to become bothersome, please let me know, then I would send him for general surgery consultation which then might lead to surgical repair which could be done using a laparoscopic robotic technique, as an outpatient procedure, with excellent recovery time.    Benign essential hypertension, blood pressure control has been good on monotherapy with lisinopril 30 mg a day.  He stopped an herbal tea that may have contained a stimulant  Home machine: average 126.76    lisinopriL (PRINIVIL,ZESTRIL) 30 MG tablet  BP Readings from Last 6 Encounters:   09/07/21 132/78   01/06/21 (!) 168/100   08/27/20 132/80   12/17/19 122/76   11/19/19 (!) 188/102     Mildly overweight  BMI now 26.9  He feels better  Diet: breakfast yogurt and fruit, biscuit with fiber  Lunch salad  Supper control portion size    Gastroesophageal reflux, occasional famotidine.     Basal cell carcinoma, removed by Mohs surgery [facility] November 2020    Varicose veins right leg, history of vein stripping surgery in approximately 1998, not  symptomatically bothersome, but he does have compression stockings that I encouraged him to use    Up-to-date on vaccinations except shingles  Got second dose Pfizer COVID 19 on March 23, 2021  Reminder to get Flu shot this autumn  Pneumo Conj 13-V (2010&after) 11/16/2018   Pneumococcal, Unspecified 01/23/2020     History of cervical radiculopathic neck pain November 2019 which resolved after administration of a steroid Dosepak.

## 2021-09-07 NOTE — PROGRESS NOTES
"SUBJECTIVE:   Roland Dejesus is a 71 year old male who presents for Preventive Visit.    Patient has been advised of split billing requirements and indicates understanding: No   Are you in the first 12 months of your Medicare coverage?  No    Healthy Habits:     In general, how would you rate your overall health?  Good    Frequency of exercise:  4-5 days/week    Duration of exercise:  45-60 minutes    Do you usually eat at least 4 servings of fruit and vegetables a day, include whole grains    & fiber and avoid regularly eating high fat or \"junk\" foods?  Yes    Taking medications regularly:  Yes    Barriers to taking medications:  None    Medication side effects:  None    Ability to successfully perform activities of daily living:  No assistance needed    Home Safety:  No safety concerns identified    Hearing Impairment:  No hearing concerns    In the past 6 months, have you been bothered by leaking of urine?  No    In general, how would you rate your overall mental or emotional health?  Good      PHQ-2 Total Score: 0    Additional concerns today:  No    Do you feel safe in your environment? Yes    Have you ever done Advance Care Planning? (For example, a Health Directive, POLST, or a discussion with a medical provider or your loved ones about your wishes): No, advance care planning information given to patient to review.  Patient declined advance care planning discussion at this time.    Fall risk  Fallen 2 or more times in the past year?: No  Any fall with injury in the past year?: No  None  Cognitive Screening   1) Repeat 3 items (Leader, Season, Table)    2) Clock draw: NORMAL  3) 3 item recall: Recalls 3 objects  Results: NORMAL clock, 1-2 items recalled: COGNITIVE IMPAIRMENT LESS LIKELY    Mini-CogTM Copyright KEVAN Fragoso. Licensed by the author for use in [facility]; reprinted with permission (fe@.[facility]). All rights reserved.      Do you have sleep apnea, excessive snoring or daytime " "drowsiness?: no    Reviewed and updated as needed this visit by clinical staff  Tobacco  Allergies  Meds              Reviewed and updated as needed this visit by Provider    Meds             Social History     Tobacco Use     Smoking status: Former Smoker     Smokeless tobacco: Never Used   Substance Use Topics     Alcohol use: Yes     Alcohol/week: 3.0 standard drinks     If you drink alcohol do you typically have >3 drinks per day or >7 drinks per week? No    Alcohol Use 9/7/2021   Prescreen: >3 drinks/day or >7 drinks/week? -   Prescreen: >3 drinks/day or >7 drinks/week? No     Current providers sharing in care for this patient include:   Patient Care Team:  Janet Hernandez NP as PCP - True Maxwell MD as Assigned PCP    The following health maintenance items are reviewed in Epic and correct as of today:  Health Maintenance Due   Topic Date Due     ANNUAL REVIEW OF  ORDERS  Never done     ZOSTER IMMUNIZATION (1 of 2) Never done     AORTIC ANEURYSM SCREENING (SYSTEM ASSIGNED)  Never done     Pneumococcal Vaccine: 65+ Years (2 of 2 - PPSV23) 01/23/2021     INFLUENZA VACCINE (1) 09/01/2021       Review of Systems  Constitutional, HEENT, cardiovascular, pulmonary, GI, , musculoskeletal, neuro, skin, endocrine and psych systems are negative, except as otherwise noted.    OBJECTIVE:   /78 (BP Location: Right arm, Patient Position: Sitting, Cuff Size: Adult Regular)   Pulse 55   Ht 1.727 m (5' 8\")   Wt 80.3 kg (177 lb)   SpO2 98%   BMI 26.91 kg/m   Estimated body mass index is 26.91 kg/m  as calculated from the following:    Height as of this encounter: 1.727 m (5' 8\").    Weight as of this encounter: 80.3 kg (177 lb).  Physical Exam    General: Alert, in no distress  Skin: No significant lesion seen.  Eyes/nose/throat: Eyes without scleral icterus, eye movements normal, pupils equal and reactive, oropharynx clear, ears with normal TM's  MSK: Neck with good ROM  Lymphatic: Neck without " adenopathy or masses  Endocrine: Thyroid with no nodules to palpation  Pulm: Lungs clear to auscultation bilaterally  Cardiac: Heart with regular rate and rhythm, no murmur or gallop  GI: Abdomen soft, nontender. No palpable enlargement of liver or spleen  + Right inguinal hernia is again again appreciated, proximately 3-4 cm, manually reducible.  MSK: Extremities no tenderness or edema  + Varicose veins right leg, no ulceration  Neuro: Moves all extremities, without focal weakness  Psych: Alert, normal mental status. Normal affect and speech    Prostate, 1+ enlarged, on the left side at the base I think I feel an approximately 2 mm nodule, will refer him to urology and also check PSA      ASSESSMENT / PLAN:     Annual wellness visit, overall doing well since her last in person meeting of January 2021  There is a new issue we are looking into today which is     Small prostate nodule on the left side which I palpated today September 7, estimated 2 mm, will check PSA and refer to urology    For lab work will get PSA, fasting lipid panel, comprehensive metabolic panel, and blood cell counts.  He feels well overall, which is important.    Constipation-- not a problem now  Past Stools hard and dry  MiraLAX seem to help     Negative Cologuard test September 22, 2020, good for 3 years.  He is never had a colonoscopy.   COLOGUARD_EXT Negative     Hernia inguinal right not too bothersome, size approximate 3-4 cm, easily reducible  Noticed more as he lost weight  Hernia is approximately 3-4 cm, easily reducible, does not enter the scrotum.    He does not seem to be bothering him too much.  I told him that if it does start to become bothersome, please let me know, then I would send him for general surgery consultation which then might lead to surgical repair which could be done using a laparoscopic robotic technique, as an outpatient procedure, with excellent recovery time.    Benign essential hypertension, blood pressure  "control has been good on monotherapy with lisinopril 30 mg a day.  He stopped an herbal tea that may have contained a stimulant  Home machine: average 126.76    lisinopriL (PRINIVIL,ZESTRIL) 30 MG tablet  BP Readings from Last 6 Encounters:   09/07/21 132/78   01/06/21 (!) 168/100   08/27/20 132/80   12/17/19 122/76   11/19/19 (!) 188/102     Mildly overweight  BMI now 26.9  He feels better  Diet: breakfast yogurt and fruit, biscuit with fiber  Lunch salad  Supper control portion size    Gastroesophageal reflux, occasional famotidine.     Basal cell carcinoma, removed by Mohs surgery Dermatology Consultnts November 2020    Varicose veins right leg, history of vein stripping surgery in approximately 1998, not symptomatically bothersome, but he does have compression stockings that I encouraged him to use    Up-to-date on vaccinations except shingles  Got second dose Pfizer COVID 19 on March 23, 2021  Reminder to get Flu shot this autumn  Pneumo Conj 13-V (2010&after) 11/16/2018   Pneumococcal, Unspecified 01/23/2020     History of cervical radiculopathic neck pain November 2019 which resolved after administration of a steroid Dosepak.        Patient has been advised of split billing requirements and indicates understanding: Yes  COUNSELING:  Reviewed preventive health counseling, as reflected in patient instructions       Healthy diet/nutrition    Estimated body mass index is 26.91 kg/m  as calculated from the following:    Height as of this encounter: 1.727 m (5' 8\").    Weight as of this encounter: 80.3 kg (177 lb).    Weight management plan: Discussed healthy diet and exercise guidelines    He reports that he has quit smoking. He has never used smokeless tobacco.      Appropriate preventive services were discussed with this patient, including applicable screening as appropriate for cardiovascular disease, diabetes, osteopenia/osteoporosis, and glaucoma.  As appropriate for age/gender, discussed screening for " colorectal cancer, prostate cancer, breast cancer, and cervical cancer. Checklist reviewing preventive services available has been given to the patient.    Reviewed patients plan of care and provided an AVS. The Basic Care Plan (routine screening as documented in Health Maintenance) for Roland meets the Care Plan requirement. This Care Plan has been established and reviewed with the Patient.    Counseling Resources:      OLGA CASTAÑEDA MD  [facility]    Identified Health Risks:

## 2021-10-13 ENCOUNTER — MYC MEDICAL ADVICE (OUTPATIENT)
Dept: INTERNAL MEDICINE | Facility: CLINIC | Age: 71
End: 2021-10-13

## 2021-10-13 DIAGNOSIS — I10 BENIGN ESSENTIAL HYPERTENSION: ICD-10-CM

## 2021-10-14 RX ORDER — LISINOPRIL 30 MG/1
30 TABLET ORAL DAILY
Qty: 90 TABLET | Refills: 4 | Status: SHIPPED | OUTPATIENT
Start: 2021-10-14 | End: 2022-09-23 | Stop reason: DRUGHIGH

## 2021-11-04 ENCOUNTER — IMMUNIZATION (OUTPATIENT)
Dept: NURSING | Facility: CLINIC | Age: 71
End: 2021-11-04
Payer: COMMERCIAL

## 2021-11-04 PROCEDURE — 0064A PR COVID VAC MODERNA 100 MCG/0.5 ML IM: CPT

## 2021-11-04 PROCEDURE — 91301 PR COVID VAC MODERNA 100 MCG/0.5 ML IM: CPT

## 2021-11-22 ENCOUNTER — TRANSFERRED RECORDS (OUTPATIENT)
Dept: HEALTH INFORMATION MANAGEMENT | Facility: CLINIC | Age: 71
End: 2021-11-22
Payer: COMMERCIAL

## 2022-04-05 ENCOUNTER — DOCUMENTATION ONLY (OUTPATIENT)
Dept: LAB | Facility: CLINIC | Age: 72
End: 2022-04-05

## 2022-04-05 DIAGNOSIS — Z12.5 SCREENING PSA (PROSTATE SPECIFIC ANTIGEN): Primary | ICD-10-CM

## 2022-04-25 ENCOUNTER — IMMUNIZATION (OUTPATIENT)
Dept: NURSING | Facility: CLINIC | Age: 72
End: 2022-04-25

## 2022-04-25 ENCOUNTER — LAB (OUTPATIENT)
Dept: LAB | Facility: CLINIC | Age: 72
End: 2022-04-25
Payer: COMMERCIAL

## 2022-04-25 DIAGNOSIS — Z12.5 SCREENING PSA (PROSTATE SPECIFIC ANTIGEN): ICD-10-CM

## 2022-04-25 LAB — PSA SERPL-MCNC: 4.11 UG/L (ref 0–6.5)

## 2022-04-25 PROCEDURE — G0103 PSA SCREENING: HCPCS

## 2022-04-25 PROCEDURE — 0064A COVID-19,PF,MODERNA (18+ YRS BOOSTER .25ML): CPT

## 2022-04-25 PROCEDURE — 91306 COVID-19,PF,MODERNA (18+ YRS BOOSTER .25ML): CPT

## 2022-04-25 PROCEDURE — 36415 COLL VENOUS BLD VENIPUNCTURE: CPT

## 2022-05-02 ENCOUNTER — TRANSFERRED RECORDS (OUTPATIENT)
Dept: HEALTH INFORMATION MANAGEMENT | Facility: CLINIC | Age: 72
End: 2022-05-02

## 2022-05-02 ENCOUNTER — MEDICAL CORRESPONDENCE (OUTPATIENT)
Dept: HEALTH INFORMATION MANAGEMENT | Facility: CLINIC | Age: 72
End: 2022-05-02

## 2022-07-19 ENCOUNTER — TRANSFERRED RECORDS (OUTPATIENT)
Dept: HEALTH INFORMATION MANAGEMENT | Facility: CLINIC | Age: 72
End: 2022-07-19

## 2022-08-05 ENCOUNTER — HOSPITAL ENCOUNTER (OUTPATIENT)
Dept: NUCLEAR MEDICINE | Facility: CLINIC | Age: 72
Discharge: HOME | End: 2022-08-05
Attending: SPECIALIST
Payer: COMMERCIAL

## 2022-08-05 ENCOUNTER — HOSPITAL ENCOUNTER (OUTPATIENT)
Dept: CT IMAGING | Facility: CLINIC | Age: 72
Discharge: HOME | End: 2022-08-05
Attending: SPECIALIST
Payer: COMMERCIAL

## 2022-08-05 DIAGNOSIS — C61 MALIGNANT NEOPLASM OF PROSTATE (H): ICD-10-CM

## 2022-08-05 LAB
CREAT BLD-MCNC: 0.8 MG/DL (ref 0.7–1.3)
GFR SERPL CREATININE-BSD FRML MDRD: >60 ML/MIN/1.73M2

## 2022-08-05 PROCEDURE — 78306 BONE IMAGING WHOLE BODY: CPT

## 2022-08-05 PROCEDURE — 250N000011 HC RX IP 250 OP 636: Performed by: SPECIALIST

## 2022-08-05 PROCEDURE — 343N000001 HC RX 343: Performed by: SPECIALIST

## 2022-08-05 PROCEDURE — 74177 CT ABD & PELVIS W/CONTRAST: CPT

## 2022-08-05 PROCEDURE — 82565 ASSAY OF CREATININE: CPT

## 2022-08-05 PROCEDURE — A9503 TC99M MEDRONATE: HCPCS | Performed by: SPECIALIST

## 2022-08-05 RX ORDER — TC 99M MEDRONATE 20 MG/10ML
20-30 INJECTION, POWDER, LYOPHILIZED, FOR SOLUTION INTRAVENOUS ONCE
Status: COMPLETED | OUTPATIENT
Start: 2022-08-05 | End: 2022-08-05

## 2022-08-05 RX ORDER — IOPAMIDOL 755 MG/ML
100 INJECTION, SOLUTION INTRAVASCULAR ONCE
Status: COMPLETED | OUTPATIENT
Start: 2022-08-05 | End: 2022-08-05

## 2022-08-05 RX ADMIN — TC 99M MEDRONATE 26.6 MCI.: 20 INJECTION, POWDER, LYOPHILIZED, FOR SOLUTION INTRAVENOUS at 10:48

## 2022-08-05 RX ADMIN — IOPAMIDOL 100 ML: 755 INJECTION, SOLUTION INTRAVENOUS at 10:38

## 2022-08-12 ENCOUNTER — TRANSFERRED RECORDS (OUTPATIENT)
Dept: HEALTH INFORMATION MANAGEMENT | Facility: CLINIC | Age: 72
End: 2022-08-12

## 2022-08-17 NOTE — TELEPHONE ENCOUNTER
RECORDS STATUS - ALL OTHER DIAGNOSIS      Action    Action Taken 8/17/22  Spoke w/ Irene @ [facility], she will fax Tumor Conference notes (8/12/22 - Dr. Pato Toure) & Bx Report (7/19/22 as well as Iso-PSA (5/2022) shortly.     Spoke w/ pt - pt advised this is a recent diagnosis. All imaging done @ [facility],     Records received, sent to HIM for upload       RECORDS RECEIVED FROM: [facility], [facility]   DATE RECEIVED:    NOTES STATUS DETAILS   OFFICE NOTE from referring provider Received 8/17 [facility]   OFFICE NOTE from medical oncologist     DISCHARGE SUMMARY from hospital     DISCHARGE REPORT from the ER     OPERATIVE REPORT     MEDICATION LIST     CLINICAL TRIAL TREATMENTS TO DATE     LABS     PATHOLOGY REPORTS Received 8/17 7/19/22: [facility]   ANYTHING RELATED TO DIAGNOSIS Received 8/17 [facility]   GENONOMIC TESTING     TYPE:     IMAGING (NEED IMAGES & REPORT)     CT SCANS PACS 8/5/19: Epic   NM Bone Scan PACS 8/5/19: Epic

## 2022-08-22 ENCOUNTER — PRE VISIT (OUTPATIENT)
Dept: RADIATION ONCOLOGY | Facility: HOSPITAL | Age: 72
End: 2022-08-22

## 2022-08-23 ENCOUNTER — OFFICE VISIT (OUTPATIENT)
Dept: RADIATION ONCOLOGY | Facility: CLINIC | Age: 72
End: 2022-08-23
Attending: RADIOLOGY
Payer: COMMERCIAL

## 2022-08-23 VITALS
OXYGEN SATURATION: 98 % | RESPIRATION RATE: 16 BRPM | HEART RATE: 56 BPM | DIASTOLIC BLOOD PRESSURE: 80 MMHG | SYSTOLIC BLOOD PRESSURE: 162 MMHG | BODY MASS INDEX: 28.02 KG/M2 | WEIGHT: 184.3 LBS | TEMPERATURE: 97.9 F

## 2022-08-23 DIAGNOSIS — C61 MALIGNANT NEOPLASM OF PROSTATE (H): Primary | ICD-10-CM

## 2022-08-23 PROCEDURE — 99205 OFFICE O/P NEW HI 60 MIN: CPT | Performed by: RADIOLOGY

## 2022-08-23 PROCEDURE — G0463 HOSPITAL OUTPT CLINIC VISIT: HCPCS

## 2022-08-23 RX ORDER — TAMSULOSIN HYDROCHLORIDE 0.4 MG/1
0.4 CAPSULE ORAL AT BEDTIME
Qty: 60 CAPSULE | Refills: 1 | Status: SHIPPED | OUTPATIENT
Start: 2022-08-23 | End: 2022-12-06

## 2022-08-23 NOTE — PROGRESS NOTES
Patient here ambulatory for radiation consult for his prostate cancer.  15 minutes spent in review of radiation process and potential side effects.  Written information given for review.   Patient has questions about all options for radiation, brachytherapy, SBRT, etc.  Seen by Dr. Pham.  Plan return to clinic for follow up and/or CT simulation as directed by physician.    Radiation Therapy Patient Education    Person involved with teaching: Patient    Patient educational needs for self management of treatment-related side effects assessment completed.  EPIC Patient Ed tab contains Patient Learning Assessment    Education Materials Given  Radiation Therapy and You, Understanding Radiation Therapy, Radiation Therapy Team, Radiation Therapy Treatment, Radiation Therapy: Managing Short-Term Side Effects, Skin Care During Radiation Therapy, Radiation Therapy: Your Daily Life, Full Bladder Instructions, Radiation Therapy to the Male Pelvis Guidelines, Welcome Letter, Insurance PA Information and Map [facility]    Educational Topics Discussed  Side effects expected and Skin care    Response To Teaching  More review necessary    GYN Only  Vaginal Dilator-given and educated: N/A    Referrals sent: None    Chemotherapy?  No

## 2022-08-23 NOTE — LETTER
8/23/2022         RE: Roland Dejesus  [address] [address]        Dear Colleague,    Thank you for referring your patient, Roland Dejesus, to the [facility] RADIATION ONCOLOGY [place]. Please see a copy of my visit note below.    Patient here ambulatory for radiation consult for his prostate cancer.  15 minutes spent in review of radiation process and potential side effects.  Written information given for review.   Patient has questions about all options for radiation, brachytherapy, SBRT, etc.  Seen by Dr. Pham.  Plan return to clinic for follow up and/or CT simulation as directed by physician.    Radiation Therapy Patient Education    Person involved with teaching: Patient    Patient educational needs for self management of treatment-related side effects assessment completed.  EPIC Patient Ed tab contains Patient Learning Assessment    Education Materials Given  Radiation Therapy and You, Understanding Radiation Therapy, Radiation Therapy Team, Radiation Therapy Treatment, Radiation Therapy: Managing Short-Term Side Effects, Skin Care During Radiation Therapy, Radiation Therapy: Your Daily Life, Full Bladder Instructions, Radiation Therapy to the Male Pelvis Guidelines, Welcome Letter, Insurance PA Information and Map [facility]    Educational Topics Discussed  Side effects expected and Skin care    Response To Teaching  More review necessary    GYN Only  Vaginal Dilator-given and educated: N/A    Referrals sent: None    Chemotherapy?  No        [facility] Radiation Oncology Consult Note     Patient: Roland Dejesus  MRN: [id number]  Date of Service: 08/23/2022          Pato Toure MD  [facility] UROLOGY  [address] [place], [place] [id number]       Dear Dr. Toure:    Thank you very much for referring this patient for consideration of radiotherapy. As you know Mr. 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  Staging work-up including CT abdomen pelvics and bone scan showed no evidence of metastatic disease.  The patient is referred to radiation oncology for evaluation and discussion of possible treatment options including radiation therapy.    HISTORY OF PRESENT ILLNESS:   Mr. Dejesus is a 72 year old male who is a retired  and has been in his usual state of good health until recently.  The patient presented with recent history of elevation of PSA to around 4-5 level with rectal examination showed palpable prostate nodule for which he was seeking further evaluation.  His last PSA was measured 4.11 on 4/25/2022.  Patient underwent transrectal ultrasound study and biopsy on 7/19/2022.  The pathology showed adenocarcinoma, [place] score 4+3 = 7 involving 70% biopsy tissue in the left lateral apex and [place] score 3+3 = 6 involving up to 15% biopsy tissue on the left side.  There is also evidence of perineural invasion.  Staging work-up including bone scan and CT abdomen pelvics showed no evidence of lymph nodes and bone metastasis.  You have discussed with the patient about various treatment options for the prostate cancer.  The patient is referred to radiation oncology for evaluation and discussion of possible treatment options including radiation therapy.    CHEMOTHERAPY HISTORY: Concurrent Chemotherapy: No    RADIATION THERAPY HISTORY: Prior Radiation: No    IMPLANTED CARDIAC DEVICE: none     Current Outpatient Medications   Medication Sig Dispense Refill     acetaminophen (TYLENOL) 500 MG tablet Take 500-1,000 mg by mouth every 6 hours as needed for mild pain       aspirin 81 MG EC tablet [ASPIRIN 81 MG EC TABLET] Take 81 mg by mouth daily.       glucosamine HCl/chondroitin sams (GLUCOSAMINE-CHONDROITIN) 2,000-1,200 mg/30 mL Liqd [GLUCOSAMINE HCL/CHONDROITIN SAMS (GLUCOSAMINE-CHONDROITIN)  2,000-1,200 MG/30 ML LIQD]        ibuprofen (ADVIL,MOTRIN) 200 MG tablet [IBUPROFEN (ADVIL,MOTRIN) 200 MG TABLET] Take 200 mg by mouth every 6 (six) hours as needed for pain.       lisinopril (ZESTRIL) 30 MG tablet Take 1 tablet (30 mg) by mouth daily 90 tablet 4     loratadine 5 mg TbDL [LORATADINE 5 MG TBDL] Take 5 mg by mouth daily.        tamsulosin (FLOMAX) 0.4 MG capsule Take 1 capsule (0.4 mg) by mouth At Bedtime 60 capsule 1     UNABLE TO FIND [UNABLE TO FIND] Med Name: Tumeric       Past Medical History:   Diagnosis Date     Anxiety      Past Surgical History:   Procedure Laterality Date     ARTHROSCOPY KNEE Right      STRIP VEIN       Patient has no known allergies.  Family History   Problem Relation Age of Onset     Chronic Obstructive Pulmonary Disease Mother      Heart Disease Mother      Heart Failure Mother      Parkinsonism Father      Aneurysm Brother      Prostate Cancer Brother      Anxiety Disorder Brother      Depression Brother      Alcoholism Brother      Substance Abuse Brother      Social History     Socioeconomic History     Marital status:      Spouse name: Not on file     Number of children: Not on file     Years of education: Not on file     Highest education level: Not on file   Occupational History     Not on file   Tobacco Use     Smoking status: Former Smoker     Smokeless tobacco: Never Used   Substance and Sexual Activity     Alcohol use: Yes     Alcohol/week: 3.0 standard drinks     Drug use: No     Sexual activity: Yes     Partners: Female   Other Topics Concern     Not on file   Social History Narrative     Not on file     Social Determinants of Health     Financial Resource Strain: Not on file   Food Insecurity: Not on file   Transportation Needs: Not on file   Physical Activity: Not on file   Stress: Not on file   Social Connections: Not on file   Intimate Partner Violence: Not on file   Housing Stability: Not on file        REVIEW OF SYMPTOMS:  A full 14-point review  of systems was performed. Pertinent findings are noted in the HPI.    General  Constitutional  Constitutional (WDL): All constitutional elements are within defined limits  EENT  Eye Disorders  Eye Disorder (WDL): Exceptions to WDL (wears glasses)  Dry Eye: Asymptomatic OR clinical or diagnostic observations only OR symptoms relieved by lubricants (occasionally)  Ear Disorders  Ear Disorder (WDL): All ear disorder elements are within defined limits  Respiratory  Respiratory  Respiratory (WDL): All respiratory elements are within defined limits  Cardiovascular  Cardiovascular  Cardiovascular (WDL): All cardiovascular elements are within defined limits  Gastrointestinal  Gastrointestinal  Gastrointestinal (WDL): All gastrointestinal elements are within defined limits  Musculoskeletal  Musculoskeletal and Connective Tissue Disorders  Musculoskeletal & Connective (WDL): All musculoskeletal & connective elements are within defined limits  Integumentary  Integumentary  Integumentary (WDL): All integumentary elements are within defined limits  Neurological  Neurosensory  Neurosensory (WDL): Exceptions to WDL  Peripheral Sensory Neuropathy: Asymptomatic (occasionally in feet)  Genitourinary/Reproductive  Genitourinary  Genitourinary (WDL): Exceptions to WDL  Urinary Frequency: Present (nocturia x1)  Lymphatic  Lymph System Disorders  Lymph (WDL): All lymph elements are within defined limits  Pain  Pain Score: No Pain (0)  AUA Assessment  Over the Past Month  How often have you had a sensation of not emptying your bladder completely after you have finished urinating: Less than half the time: Score: 2  How often have you had to urinate again less than 2 hours after you finshed urinating: About half the time: Score: 3  How often have you found you stopped and started again several times when you urinated: Less than half the time: Score: 2  How often have you found it difficult to postpone urine: About half the time: Score:  3  How often have you had a weak urinary stream: Less than half the time: Score: 2  How often have you had to push or starin to begin uriation: Less than 1 time in 5: Score: 1  How many times do you most typically get up to urinate from the time you went to bed at night until the time you got up in the morning?: 1 time, Score: 1  Total AUA Score: Degree of Severity: 8-19 is Moderate (14)  If you had to spend the rest of your life with your urinary condition just the way it is now, how would you feel: Mostly Satisfied  Please check the appropriate box that describes your ability to have an erection: Normal erection                                                           Accompanied by  Accompanied By: self only    ECOG Status: 0    Prostate Specific Antigen Screen   Date Value Ref Range Status   04/25/2022 4.11 0.00 - 6.50 ug/L Final   09/07/2021 5.03 0.00 - 6.50 ug/L Final   08/27/2020 3.6 0.0 - 6.5 ng/mL Final   08/23/2019 4.2 0.0 - 4.5 ng/mL Final     Imaging: Reviewed    Pathology: Reviewed    Objective:     PHYSICAL EXAMINATION:    BP (!) 162/80 (BP Location: Right arm, Patient Position: Sitting, Cuff Size: Adult Regular)   Pulse 56   Temp 97.9  F (36.6  C) (Oral)   Resp 16   Wt 83.6 kg (184 lb 4.8 oz)   SpO2 98%   BMI 28.02 kg/m      Gen: Alert, in NAD  Pulm: No wheezing, stridor or respiratory distress  CV: Well-perfused, no cyanosis, no pedal edema  Back: No step-offs or pain to palpation along the thoracolumbar spine  Rectal: Deferred  : Deferred  Musculoskeletal: Normal muscle bulk and tone  Skin: Normal color and turgor  Neurologic: A/Ox3, CN II-XII intact, normal gait and station  Psychiatric: Appropriate mood and affect     Impression     Unfavorable intermediate risk prostate cancer, clinical stage T2 a N0 M0, [place] grade 4+3 = 7 and 3+3 = 6 involving left side of prostate gland and pretherapy PSA of 4.11.  Staging work-up including CT abdomen pelvics and bone scan showed no evidence of  metastatic disease.    Assessment & Plan:     I have personally reviewed his upcoming medical record today.  I have also discussed with the patient about all the possible treatment options for the prostate cancer including clinical observation, hormonal therapy, surgery, cryotherapy, and radiation therapy in the form of radioactive seed implant and SBRT/IMRT/IGRT/TomoTherapy.  The NCCN guideline for prostate cancer treatment recommendation has also been reviewed with the patient.  The possible risks and the side effects of radiation therapy have been discussed with the patient in detail and at a great length.  Patient is also informed that all the curative options have equal chance of cure for his prostate cancer with different side effect profile.  The patient has to make his own decision regarding treatment choice.  He seems to understand it well.  His combined AUA symptomatic score today is 14/35.  Given the status of T2a disease, Sirena grade 7 and a pretherapy PSA of 4.11, I think the patient has approximately 50-61% chance of organ-confined disease, 30-40% chance to have capsular penetration, 4-10% risk of seminal vesicle involvement and less than 5% risk of lymph node metastasis based on Dr. Pretty's table.  Given the evidence of Emery 7 disease and the good health status, I think it is reasonable to consider some form of definitive therapy.  The pros and the cons of different treatment options have also been discussed with the patient in detail and at great length.  He seems to understand it well.  The patient wished to take some time to think over before his final decision.  He is informed to contact Dr. Toure's office if he decide to proceed with the surgery.  The patient is also informed to contact radiation oncology and set up time for follow-up visit if he consider radiation therapy.  I will prescribe Flomax for his dysuria and also schedule patient to have MRI prostate for additional  staging.    Again, thank you very much for the referral and allowing me to participate in the care of this patient.  If you have any questions or concerns about this consultation, please do not hesitate to call.  I spent approximately 60 minutes today with the patient and 80% time was used for counseling.      Sincerely,          Ciarra Pham MD, PhD  Department of Radiation Oncology   [facility] Radiation Oncology  Tel: [phone]  Page: [phone]    [facility]  [address] [place] [address]     [address] [place] [address]    CC:  Patient Care Team:  True Mahajan MD as PCP - General (Internal Medicine)  True Mahajan MD as Assigned PCP  Pato Toure MD as MD (Urology)  Ciarra Pham MD as MD (Radiation Oncology)          Again, thank you for allowing me to participate in the care of your patient.        Sincerely,        Ciarra Pham MD

## 2022-08-23 NOTE — PROGRESS NOTES
[facility] Radiation Oncology Consult Note     Patient: Roland Dejesus  MRN: [id number]  Date of Service: 08/23/2022          aPto Toure MD  [facility] UROLOGY  [address] [address]       Dear Dr. Toure:    Thank you very much for referring this patient for consideration of radiotherapy. As you know Mr. 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  Staging work-up including CT abdomen pelvics and bone scan showed no evidence of metastatic disease.  The patient is referred to radiation oncology for evaluation and discussion of possible treatment options including radiation therapy.    HISTORY OF PRESENT ILLNESS:   Mr. Dejesus is a 72 year old male who is a retired  and has been in his usual state of good health until recently.  The patient presented with recent history of elevation of PSA to around 4-5 level with rectal examination showed palpable prostate nodule for which he was seeking further evaluation.  His last PSA was measured 4.11 on 4/25/2022.  Patient underwent transrectal ultrasound study and biopsy on 7/19/2022.  The pathology showed adenocarcinoma, Sirena score 4+3 = 7 involving 70% biopsy tissue in the left lateral apex and Dexter score 3+3 = 6 involving up to 15% biopsy tissue on the left side.  There is also evidence of perineural invasion.  Staging work-up including bone scan and CT abdomen pelvics showed no evidence of lymph nodes and bone metastasis.  You have discussed with the patient about various treatment options for the prostate cancer.  The patient is referred to radiation oncology for evaluation and discussion of possible treatment options including radiation therapy.    CHEMOTHERAPY HISTORY: Concurrent Chemotherapy: No    RADIATION THERAPY HISTORY: Prior Radiation:  No    IMPLANTED CARDIAC DEVICE: none     Current Outpatient Medications   Medication Sig Dispense Refill     acetaminophen (TYLENOL) 500 MG tablet Take 500-1,000 mg by mouth every 6 hours as needed for mild pain       aspirin 81 MG EC tablet [ASPIRIN 81 MG EC TABLET] Take 81 mg by mouth daily.       glucosamine HCl/chondroitin sams (GLUCOSAMINE-CHONDROITIN) 2,000-1,200 mg/30 mL Liqd [GLUCOSAMINE HCL/CHONDROITIN SAMS (GLUCOSAMINE-CHONDROITIN) 2,000-1,200 MG/30 ML LIQD]        ibuprofen (ADVIL,MOTRIN) 200 MG tablet [IBUPROFEN (ADVIL,MOTRIN) 200 MG TABLET] Take 200 mg by mouth every 6 (six) hours as needed for pain.       lisinopril (ZESTRIL) 30 MG tablet Take 1 tablet (30 mg) by mouth daily 90 tablet 4     loratadine 5 mg TbDL [LORATADINE 5 MG TBDL] Take 5 mg by mouth daily.        tamsulosin (FLOMAX) 0.4 MG capsule Take 1 capsule (0.4 mg) by mouth At Bedtime 60 capsule 1     UNABLE TO FIND [UNABLE TO FIND] Med Name: Tumeric       Past Medical History:   Diagnosis Date     Anxiety      Past Surgical History:   Procedure Laterality Date     ARTHROSCOPY KNEE Right      STRIP VEIN       Patient has no known allergies.  Family History   Problem Relation Age of Onset     Chronic Obstructive Pulmonary Disease Mother      Heart Disease Mother      Heart Failure Mother      Parkinsonism Father      Aneurysm Brother      Prostate Cancer Brother      Anxiety Disorder Brother      Depression Brother      Alcoholism Brother      Substance Abuse Brother      Social History     Socioeconomic History     Marital status:      Spouse name: Not on file     Number of children: Not on file     Years of education: Not on file     Highest education level: Not on file   Occupational History     Not on file   Tobacco Use     Smoking status: Former Smoker     Smokeless tobacco: Never Used   Substance and Sexual Activity     Alcohol use: Yes     Alcohol/week: 3.0 standard drinks     Drug use: No     Sexual activity: Yes     Partners: Female    Other Topics Concern     Not on file   Social History Narrative     Not on file     Social Determinants of Health     Financial Resource Strain: Not on file   Food Insecurity: Not on file   Transportation Needs: Not on file   Physical Activity: Not on file   Stress: Not on file   Social Connections: Not on file   Intimate Partner Violence: Not on file   Housing Stability: Not on file        REVIEW OF SYMPTOMS:  A full 14-point review of systems was performed. Pertinent findings are noted in the HPI.    General  Constitutional  Constitutional (WDL): All constitutional elements are within defined limits  EENT  Eye Disorders  Eye Disorder (WDL): Exceptions to WDL (wears glasses)  Dry Eye: Asymptomatic OR clinical or diagnostic observations only OR symptoms relieved by lubricants (occasionally)  Ear Disorders  Ear Disorder (WDL): All ear disorder elements are within defined limits  Respiratory  Respiratory  Respiratory (WDL): All respiratory elements are within defined limits  Cardiovascular  Cardiovascular  Cardiovascular (WDL): All cardiovascular elements are within defined limits  Gastrointestinal  Gastrointestinal  Gastrointestinal (WDL): All gastrointestinal elements are within defined limits  Musculoskeletal  Musculoskeletal and Connective Tissue Disorders  Musculoskeletal & Connective (WDL): All musculoskeletal & connective elements are within defined limits  Integumentary  Integumentary  Integumentary (WDL): All integumentary elements are within defined limits  Neurological  Neurosensory  Neurosensory (WDL): Exceptions to WDL  Peripheral Sensory Neuropathy: Asymptomatic (occasionally in feet)  Genitourinary/Reproductive  Genitourinary  Genitourinary (WDL): Exceptions to WDL  Urinary Frequency: Present (nocturia x1)  Lymphatic  Lymph System Disorders  Lymph (WDL): All lymph elements are within defined limits  Pain  Pain Score: No Pain (0)  AUA Assessment  Over the Past Month  How often have you had a sensation  of not emptying your bladder completely after you have finished urinating: Less than half the time: Score: 2  How often have you had to urinate again less than 2 hours after you finshed urinating: About half the time: Score: 3  How often have you found you stopped and started again several times when you urinated: Less than half the time: Score: 2  How often have you found it difficult to postpone urine: About half the time: Score: 3  How often have you had a weak urinary stream: Less than half the time: Score: 2  How often have you had to push or starin to begin uriation: Less than 1 time in 5: Score: 1  How many times do you most typically get up to urinate from the time you went to bed at night until the time you got up in the morning?: 1 time, Score: 1  Total AUA Score: Degree of Severity: 8-19 is Moderate (14)  If you had to spend the rest of your life with your urinary condition just the way it is now, how would you feel: Mostly Satisfied  Please check the appropriate box that describes your ability to have an erection: Normal erection                                                           Accompanied by  Accompanied By: self only    ECOG Status: 0    Prostate Specific Antigen Screen   Date Value Ref Range Status   04/25/2022 4.11 0.00 - 6.50 ug/L Final   09/07/2021 5.03 0.00 - 6.50 ug/L Final   08/27/2020 3.6 0.0 - 6.5 ng/mL Final   08/23/2019 4.2 0.0 - 4.5 ng/mL Final     Imaging: Reviewed    Pathology: Reviewed    Objective:     PHYSICAL EXAMINATION:    BP (!) 162/80 (BP Location: Right arm, Patient Position: Sitting, Cuff Size: Adult Regular)   Pulse 56   Temp 97.9  F (36.6  C) (Oral)   Resp 16   Wt 83.6 kg (184 lb 4.8 oz)   SpO2 98%   BMI 28.02 kg/m      Gen: Alert, in NAD  Pulm: No wheezing, stridor or respiratory distress  CV: Well-perfused, no cyanosis, no pedal edema  Back: No step-offs or pain to palpation along the thoracolumbar spine  Rectal: Deferred  : Deferred  Musculoskeletal: Normal  muscle bulk and tone  Skin: Normal color and turgor  Neurologic: A/Ox3, CN II-XII intact, normal gait and station  Psychiatric: Appropriate mood and affect     Impression     Unfavorable intermediate risk prostate cancer, clinical stage T2 a N0 M0, [place] grade 4+3 = 7 and 3+3 = 6 involving left side of prostate gland and pretherapy PSA of 4.11.  Staging work-up including CT abdomen pelvics and bone scan showed no evidence of metastatic disease.    Assessment & Plan:     I have personally reviewed his upcoming medical record today.  I have also discussed with the patient about all the possible treatment options for the prostate cancer including clinical observation, hormonal therapy, surgery, cryotherapy, and radiation therapy in the form of radioactive seed implant and SBRT/IMRT/IGRT/TomoTherapy.  The NCCN guideline for prostate cancer treatment recommendation has also been reviewed with the patient.  The possible risks and the side effects of radiation therapy have been discussed with the patient in detail and at a great length.  Patient is also informed that all the curative options have equal chance of cure for his prostate cancer with different side effect profile.  The patient has to make his own decision regarding treatment choice.  He seems to understand it well.  His combined AUA symptomatic score today is 14/35.  Given the status of T2a disease, [place] grade 7 and a pretherapy PSA of 4.11, I think the patient has approximately 50-61% chance of organ-confined disease, 30-40% chance to have capsular penetration, 4-10% risk of seminal vesicle involvement and less than 5% risk of lymph node metastasis based on Dr. Pretty's table.  Given the evidence of [place] 7 disease and the good health status, I think it is reasonable to consider some form of definitive therapy.  The pros and the cons of different treatment options have also been discussed with the patient in detail and at great length.  He seems to  understand it well.  The patient wished to take some time to think over before his final decision.  He is informed to contact Dr. Toure's office if he decide to proceed with the surgery.  The patient is also informed to contact radiation oncology and set up time for follow-up visit if he consider radiation therapy.  I will prescribe Flomax for his dysuria and also schedule patient to have MRI prostate for additional staging.    Again, thank you very much for the referral and allowing me to participate in the care of this patient.  If you have any questions or concerns about this consultation, please do not hesitate to call.  I spent approximately 60 minutes today with the patient and 80% time was used for counseling.      Sincerely,          Ciarra Pham MD, PhD  Department of Radiation Oncology   [facility] Radiation Oncology  Tel: [phone]  Page: [phone]    [facility]  [address] [place] [address]     [address]    [place] [address]    CC:  Patient Care Team:  rTue Mahajan MD as PCP - General (Internal Medicine)  True Mahajan MD as Assigned PCP  Pato Toure MD as MD (Urology)  Ciarra Pham MD as MD (Radiation Oncology)

## 2022-08-29 ENCOUNTER — TELEPHONE (OUTPATIENT)
Dept: RADIATION ONCOLOGY | Facility: HOSPITAL | Age: 72
End: 2022-08-29

## 2022-08-29 NOTE — TELEPHONE ENCOUNTER
Called patient today to discuss his treatment plan.  We will discuss his treatment plan more detail upon next visit in 2 weeks.

## 2022-09-12 ENCOUNTER — HOSPITAL ENCOUNTER (OUTPATIENT)
Dept: MRI IMAGING | Facility: HOSPITAL | Age: 72
Discharge: HOME | End: 2022-09-12
Attending: RADIOLOGY | Admitting: RADIOLOGY
Payer: COMMERCIAL

## 2022-09-12 DIAGNOSIS — C61 MALIGNANT NEOPLASM OF PROSTATE (H): ICD-10-CM

## 2022-09-12 PROCEDURE — A9585 GADOBUTROL INJECTION: HCPCS | Performed by: RADIOLOGY

## 2022-09-12 PROCEDURE — 72197 MRI PELVIS W/O & W/DYE: CPT

## 2022-09-12 PROCEDURE — 255N000002 HC RX 255 OP 636: Performed by: RADIOLOGY

## 2022-09-12 RX ORDER — GADOBUTROL 604.72 MG/ML
0.1 INJECTION INTRAVENOUS ONCE
Status: COMPLETED | OUTPATIENT
Start: 2022-09-12 | End: 2022-09-12

## 2022-09-12 RX ADMIN — GADOBUTROL 8 ML: 604.72 INJECTION INTRAVENOUS at 14:36

## 2022-09-16 ENCOUNTER — OFFICE VISIT (OUTPATIENT)
Dept: RADIATION ONCOLOGY | Facility: CLINIC | Age: 72
End: 2022-09-16
Attending: RADIOLOGY
Payer: COMMERCIAL

## 2022-09-16 ENCOUNTER — LAB (OUTPATIENT)
Dept: INFUSION THERAPY | Facility: CLINIC | Age: 72
End: 2022-09-16
Attending: RADIOLOGY
Payer: COMMERCIAL

## 2022-09-16 VITALS
WEIGHT: 186.9 LBS | DIASTOLIC BLOOD PRESSURE: 72 MMHG | RESPIRATION RATE: 16 BRPM | HEART RATE: 68 BPM | TEMPERATURE: 97.8 F | OXYGEN SATURATION: 97 % | SYSTOLIC BLOOD PRESSURE: 147 MMHG | BODY MASS INDEX: 28.42 KG/M2

## 2022-09-16 DIAGNOSIS — C61 MALIGNANT NEOPLASM OF PROSTATE (H): Primary | ICD-10-CM

## 2022-09-16 DIAGNOSIS — C61 MALIGNANT NEOPLASM OF PROSTATE (H): ICD-10-CM

## 2022-09-16 LAB — PSA SERPL-MCNC: 7.3 NG/ML (ref 0–6.5)

## 2022-09-16 PROCEDURE — 84153 ASSAY OF PSA TOTAL: CPT

## 2022-09-16 PROCEDURE — 36415 COLL VENOUS BLD VENIPUNCTURE: CPT

## 2022-09-16 PROCEDURE — 99215 OFFICE O/P EST HI 40 MIN: CPT | Performed by: RADIOLOGY

## 2022-09-16 PROCEDURE — G0463 HOSPITAL OUTPT CLINIC VISIT: HCPCS

## 2022-09-16 NOTE — PROGRESS NOTES
"Patient here ambulatory for follow up on his prostate cancer.  MRI done and here to review results.  Seen by Dr. hPam.  Plan return to clinic for follow up as directed by physician.    Oncology Rooming Note    September 16, 2022 8:38 AM   Roland Dejesus is a 72 year old male who presents for:    Chief Complaint   Patient presents with     Oncology Clinic Visit     Follow up with Dr. Pham     Initial Vitals: BP (!) 147/72 (BP Location: Right arm, Patient Position: Sitting, Cuff Size: Adult Regular)   Pulse 68   Temp 97.8  F (36.6  C) (Oral)   Resp 16   Wt 84.8 kg (186 lb 14.4 oz)   SpO2 97%   BMI 28.42 kg/m   Estimated body mass index is 28.42 kg/m  as calculated from the following:    Height as of 9/7/21: 1.727 m (5' 8\").    Weight as of this encounter: 84.8 kg (186 lb 14.4 oz). Body surface area is 2.02 meters squared.  No Pain (0) Comment: Data Unavailable   No LMP for male patient.  Allergies reviewed: Yes  Medications reviewed: Yes    Medications: Medication refills not needed today.  Pharmacy name entered into DocASAP: [id number] [address] [facility]    Clinical concerns: MRI resuts Dr. Pham was notified.      Radha Pagan RN            "

## 2022-09-16 NOTE — PROGRESS NOTES
[facility] Radiation Oncology Follow Up Note    Patient: Roland Dejesus  MRN: [id number]  Date of Service: 09/16/2022            Mr. Dejesus is a 72 year old male who is a retired  and has been in his usual state of good health until recently.  The patient presented with recent history of elevation of PSA to around 4-5 level with rectal examination showed palpable prostate nodule for which he was seeking further evaluation.  His last PSA was measured 4.11 on 4/25/2022.  Patient underwent transrectal ultrasound study and biopsy on 7/19/2022.  The pathology showed adenocarcinoma, Sirena score 4+3 = 7 involving 70% biopsy tissue in the left lateral apex and Sirena score 3+3 = 6 involving up to 15% biopsy tissue on the left side.  There is also evidence of perineural invasion.  Staging work-up including bone scan and CT abdomen pelvics showed no evidence of lymph nodes and bone metastasis.  You have discussed with the patient about various treatment options for the prostate cancer.  The patient was initially seen and evaluated on 8/23/2022.  Patient then had staging MRI prostate on 9/12/2022.  This showed PI-RADS 5 lesion in the left posterior lateral peripheral zone with no evidence of lymph nodes and bone metastasis.  The patient is here for further evaluation and discussion of treatment options.     CHEMOTHERAPY HISTORY: Concurrent Chemotherapy: No     RADIATION THERAPY HISTORY: Prior Radiation: No     IMPLANTED CARDIAC DEVICE: none     Imaging: Imaging results 30 days: MR Prostate wo & w Contrast    Result Date: 9/13/2022  EXAM: MRI PELVIS PROSTATE PROTOCOL LOCATION: [facility] DATE/TIME: 9/12/2022 2:35 PM INDICATION:  Malignant neoplasm of prostate (H). Prostate cancer, clinical stage T2a, N0, M0, [place] grade 4+3 = 7 and 3+3 = 6 involving left side of prostate gland and pretherapy PSA of 4.11. COMPARISON: 08/05/2022 Nuclear Medicine bone scan and CT abdomen and pelvis.  TECHNIQUE: Routine MRI prostate protocol including T1, diffusion, thin section high resolution T2 and dynamic T1 thin section with IV contrast. Study was processed using [employer] multiparametric computer-aided detection system to optimize radiologist interpretation by generating multiplanar and 3D reconstructions and creating subtraction images from the dynamic contrast data. CONTRAST: Gadavist 8 mL FINDINGS: PERIPHERAL ZONE: Extensive postbiopsy blood products throughout the peripheral zone. Lesion 1. Ill-defined 1.6 x 1.5 x 0.9 cm lesion left posterolateral peripheral zone mid gland. T2 appearance: Heterogeneous or non-circumscribed, rounded, moderate hypointensity. Diffusion-Weighted Imaging: Focal markedly hypointense on ADC and markedly hyperintense on DWI. Enhancement: Indeterminate due to postbiopsy blood products. Prostate margin: No involvement.  Diffusion abnormality meets PI-RADS 5. Region of interest created for biopsy and/or follow-up. TRANSITIONAL ZONE: No suspicious focal finding in the transitional zone. Stromal and glandular BPH. No seminal vesicle invasion. No pelvic lymphadenopathy. No lesions in the visualized bones. PROSTATE GLAND VOLUME: 57 cc Small bilateral fat-containing inguinal hernias. Of note, the right inguinal hernia did contain a short segment of small intestine at time of 08/05/2022 CT.     IMPRESSION: 1.  Lesion 1 left posterolateral peripheral zone mid gland is assigned PI-RADS CATEGORY 5: Very high. Clinically significant cancer is highly likely to be present.    Prostate Specific Antigen Screen   Date Value Ref Range Status   04/25/2022 4.11 0.00 - 6.50 ug/L Final   09/07/2021 5.03 0.00 - 6.50 ug/L Final   08/27/2020 3.6 0.0 - 6.5 ng/mL Final   08/23/2019 4.2 0.0 - 4.5 ng/mL Final     Assessment / Impression     Unfavorable intermediate risk prostate cancer, clinical stage T2 a N0 M0, [place] grade 4+3 = 7 and 3+3 = 6 involving left side of prostate gland and pretherapy PSA of  4.11.  Staging work-up including MRI, CT abdomen pelvics and bone scan showed no evidence of metastatic disease.    Plan:     I have personally reviewed his upcoming medical record today.  I have also discussed with the patient about all the possible treatment options for the prostate cancer including clinical observation, hormonal therapy, surgery, cryotherapy, and radiation therapy in the form of radioactive seed implant and SBRT/IMRT/IGRT/TomoTherapy.  The NCCN guideline for prostate cancer treatment recommendation has also been reviewed with the patient.  The possible risks and the side effects of radiation therapy have been discussed with the patient in detail and at a great length.  Patient is also informed that all the curative options have equal chance of cure for his prostate cancer with different side effect profile.  The patient has to make his own decision regarding treatment choice.  He seems to understand it well.  His combined AUA symptomatic score today is 14/35.  Given the status of T2a disease, [place] grade 7 and a pretherapy PSA of 4.11, I think the patient has approximately 50-61% chance of organ-confined disease, 30-40% chance to have capsular penetration, 4-10% risk of seminal vesicle involvement and less than 5% risk of lymph node metastasis based on Dr. Pretty's table.  Given the evidence of L[place]y 7 disease and the good health status, I think it is reasonable to consider some form of definitive therapy.  The pros and the cons of different treatment options have also been discussed with the patient in detail and at great length.  He seems to understand it well.  The patient is considering a 6-month hormone therapy and definitive external beam radiation therapy (7020 cGY in 26 treatments) targeted to the prostate gland only at this time. The patient wished to take some time to think over before his final decision.    He is scheduled return to radiation oncology 1 week for another office  follow-up.       I spent approximately 40 minutes today with the patient and 80% time was used for counseling.      Ciarra Pham MD, PhD  Department of Radiation Oncology   [facility] Radiation Oncology  Tel: [phone]  Page: [phone]    [facility]  [address] [place] [address]     [address] [place] [address]      CC:  Patient Care Team:  True Mahajan MD as PCP - General (Internal Medicine)  True Mahajan MD as Assigned PCP  Pato Toure MD as MD (Urology)  Ciarra Pham MD as MD (Radiation Oncology)  Ciarra Pham MD as Assigned Cancer Care Provider

## 2022-09-16 NOTE — LETTER
9/16/2022         RE: Roland Dejesus  [address] [facility] [address]        Dear Colleague,    Thank you for referring your patient, Roland Dejesus, to the [facility] RADIATION ONCOLOGY [place]. Please see a copy of my visit note below.      [facility] Radiation Oncology Follow Up Note    Patient: Roland Dejesus  MRN: [id number]  Date of Service: 09/16/2022            Mr. Dejesus is a 72 year old male who is a retired  and has been in his usual state of good health until recently.  The patient presented with recent history of elevation of PSA to around 4-5 level with rectal examination showed palpable prostate nodule for which he was seeking further evaluation.  His last PSA was measured 4.11 on 4/25/2022.  Patient underwent transrectal ultrasound study and biopsy on 7/19/2022.  The pathology showed adenocarcinoma, Sirena score 4+3 = 7 involving 70% biopsy tissue in the left lateral apex and Cobb score 3+3 = 6 involving up to 15% biopsy tissue on the left side.  There is also evidence of perineural invasion.  Staging work-up including bone scan and CT abdomen pelvics showed no evidence of lymph nodes and bone metastasis.  You have discussed with the patient about various treatment options for the prostate cancer.  The patient was initially seen and evaluated on 8/23/2022.  Patient then had staging MRI prostate on 9/12/2022.  This showed PI-RADS 5 lesion in the left posterior lateral peripheral zone with no evidence of lymph nodes and bone metastasis.  The patient is here for further evaluation and discussion of treatment options.     CHEMOTHERAPY HISTORY: Concurrent Chemotherapy: No     RADIATION THERAPY HISTORY: Prior Radiation: No     IMPLANTED CARDIAC DEVICE: none     Imaging: Imaging results 30 days: MR Prostate wo & w Contrast    Result Date: 9/13/2022  EXAM: MRI PELVIS PROSTATE PROTOCOL LOCATION: [facility] DATE/TIME: 9/12/2022 2:35 PM INDICATION:   Malignant neoplasm of prostate (H). Prostate cancer, clinical stage T2a, N0, M0, Sirena grade 4+3 = 7 and 3+3 = 6 involving left side of prostate gland and pretherapy PSA of 4.11. COMPARISON: 08/05/2022 Nuclear Medicine bone scan and CT abdomen and pelvis. TECHNIQUE: Routine MRI prostate protocol including T1, diffusion, thin section high resolution T2 and dynamic T1 thin section with IV contrast. Study was processed using Rebit multiparametric computer-aided detection system to optimize radiologist interpretation by generating multiplanar and 3D reconstructions and creating subtraction images from the dynamic contrast data. CONTRAST: Gadavist 8 mL FINDINGS: PERIPHERAL ZONE: Extensive postbiopsy blood products throughout the peripheral zone. Lesion 1. Ill-defined 1.6 x 1.5 x 0.9 cm lesion left posterolateral peripheral zone mid gland. T2 appearance: Heterogeneous or non-circumscribed, rounded, moderate hypointensity. Diffusion-Weighted Imaging: Focal markedly hypointense on ADC and markedly hyperintense on DWI. Enhancement: Indeterminate due to postbiopsy blood products. Prostate margin: No involvement.  Diffusion abnormality meets PI-RADS 5. Region of interest created for biopsy and/or follow-up. TRANSITIONAL ZONE: No suspicious focal finding in the transitional zone. Stromal and glandular BPH. No seminal vesicle invasion. No pelvic lymphadenopathy. No lesions in the visualized bones. PROSTATE GLAND VOLUME: 57 cc Small bilateral fat-containing inguinal hernias. Of note, the right inguinal hernia did contain a short segment of small intestine at time of 08/05/2022 CT.     IMPRESSION: 1.  Lesion 1 left posterolateral peripheral zone mid gland is assigned PI-RADS CATEGORY 5: Very high. Clinically significant cancer is highly likely to be present.    Prostate Specific Antigen Screen   Date Value Ref Range Status   04/25/2022 4.11 0.00 - 6.50 ug/L Final   09/07/2021 5.03 0.00 - 6.50 ug/L Final   08/27/2020 3.6 0.0 -  6.5 ng/mL Final   08/23/2019 4.2 0.0 - 4.5 ng/mL Final     Assessment / Impression     Unfavorable intermediate risk prostate cancer, clinical stage T2 a N0 M0, Griffin grade 4+3 = 7 and 3+3 = 6 involving left side of prostate gland and pretherapy PSA of 4.11.  Staging work-up including MRI, CT abdomen pelvics and bone scan showed no evidence of metastatic disease.    Plan:     I have personally reviewed his upcoming medical record today.  I have also discussed with the patient about all the possible treatment options for the prostate cancer including clinical observation, hormonal therapy, surgery, cryotherapy, and radiation therapy in the form of radioactive seed implant and SBRT/IMRT/IGRT/TomoTherapy.  The NCCN guideline for prostate cancer treatment recommendation has also been reviewed with the patient.  The possible risks and the side effects of radiation therapy have been discussed with the patient in detail and at a great length.  Patient is also informed that all the curative options have equal chance of cure for his prostate cancer with different side effect profile.  The patient has to make his own decision regarding treatment choice.  He seems to understand it well.  His combined AUA symptomatic score today is 14/35.  Given the status of T2a disease, Sirena grade 7 and a pretherapy PSA of 4.11, I think the patient has approximately 50-61% chance of organ-confined disease, 30-40% chance to have capsular penetration, 4-10% risk of seminal vesicle involvement and less than 5% risk of lymph node metastasis based on Dr. Pretty's table.  Given the evidence of Griffin 7 disease and the good health status, I think it is reasonable to consider some form of definitive therapy.  The pros and the cons of different treatment options have also been discussed with the patient in detail and at great length.  He seems to understand it well.  The patient is considering a 6-month hormone therapy and definitive external  "beam radiation therapy (7020 cGY in 26 treatments) targeted to the prostate gland only at this time. The patient wished to take some time to think over before his final decision.    He is scheduled return to radiation oncology 1 week for another office follow-up.       I spent approximately 40 minutes today with the patient and 80% time was used for counseling.      Ciarra Pham MD, PhD  Department of Radiation Oncology   [facility] Radiation Oncology  Tel: [phone]  Page: [phone]    [facility]  [address] [place] [address]     [facility]  [address]   [place] [address]      CC:  Patient Care Team:  True Mahajan MD as PCP - General (Internal Medicine)  True Mahajan MD as Assigned PCP  Pato Toure MD as MD (Urology)  Ciarra Pham MD as MD (Radiation Oncology)  Ciarra Pham MD as Assigned Cancer Care Provider            Patient here ambulatory for follow up on his prostate cancer.  MRI done and here to review results.  Seen by Dr. Pham.  Plan return to clinic for follow up as directed by physician.    Oncology Rooming Note    September 16, 2022 8:38 AM   Roland Dejesus is a 72 year old male who presents for:    Chief Complaint   Patient presents with     Oncology Clinic Visit     Follow up with Dr. Pham     Initial Vitals: BP (!) 147/72 (BP Location: Right arm, Patient Position: Sitting, Cuff Size: Adult Regular)   Pulse 68   Temp 97.8  F (36.6  C) (Oral)   Resp 16   Wt 84.8 kg (186 lb 14.4 oz)   SpO2 97%   BMI 28.42 kg/m   Estimated body mass index is 28.42 kg/m  as calculated from the following:    Height as of 9/7/21: 1.727 m (5' 8\").    Weight as of this encounter: 84.8 kg (186 lb 14.4 oz). Body surface area is 2.02 meters squared.  No Pain (0) Comment: Data Unavailable   No LMP for male patient.  Allergies reviewed: Yes  Medications reviewed: Yes    Medications: Medication refills not needed today.  Pharmacy name entered into [employer]: [facility] " [id number] [address]    Clinical concerns: MRI resuts Dr. Pham was notified.      Radha Pagan RN                Again, thank you for allowing me to participate in the care of your patient.        Sincerely,        Ciarra Pham MD

## 2022-09-22 ASSESSMENT — ENCOUNTER SYMPTOMS
DIARRHEA: 0
EYE PAIN: 0
ABDOMINAL PAIN: 0
WEAKNESS: 0
FREQUENCY: 0
ARTHRALGIAS: 0
PARESTHESIAS: 0
SHORTNESS OF BREATH: 0
HEMATURIA: 0
NERVOUS/ANXIOUS: 0
CHILLS: 0
DIZZINESS: 0
CONSTIPATION: 0
FEVER: 0
MYALGIAS: 0
DYSURIA: 0
COUGH: 0
PALPITATIONS: 0
SORE THROAT: 0
JOINT SWELLING: 0
HEMATOCHEZIA: 0
HEADACHES: 0
NAUSEA: 0
HEARTBURN: 0

## 2022-09-22 ASSESSMENT — ACTIVITIES OF DAILY LIVING (ADL): CURRENT_FUNCTION: NO ASSISTANCE NEEDED

## 2022-09-23 ENCOUNTER — OFFICE VISIT (OUTPATIENT)
Dept: INTERNAL MEDICINE | Facility: CLINIC | Age: 72
End: 2022-09-23
Payer: COMMERCIAL

## 2022-09-23 VITALS
DIASTOLIC BLOOD PRESSURE: 84 MMHG | SYSTOLIC BLOOD PRESSURE: 138 MMHG | HEART RATE: 55 BPM | HEIGHT: 69 IN | OXYGEN SATURATION: 99 % | WEIGHT: 184 LBS | BODY MASS INDEX: 27.25 KG/M2

## 2022-09-23 DIAGNOSIS — I10 BENIGN ESSENTIAL HYPERTENSION: ICD-10-CM

## 2022-09-23 DIAGNOSIS — Z23 HIGH PRIORITY FOR COVID-19 VACCINATION: ICD-10-CM

## 2022-09-23 DIAGNOSIS — C61 PROSTATE CANCER (H): ICD-10-CM

## 2022-09-23 DIAGNOSIS — K40.90 UNILATERAL INGUINAL HERNIA WITHOUT OBSTRUCTION OR GANGRENE, RECURRENCE NOT SPECIFIED: ICD-10-CM

## 2022-09-23 DIAGNOSIS — Z23 INFLUENZA VACCINATION ADMINISTERED AT CURRENT VISIT: ICD-10-CM

## 2022-09-23 DIAGNOSIS — Z00.00 ROUTINE GENERAL MEDICAL EXAMINATION AT A HEALTH CARE FACILITY: Primary | ICD-10-CM

## 2022-09-23 LAB
ALBUMIN SERPL BCG-MCNC: 4.6 G/DL (ref 3.5–5.2)
ALP SERPL-CCNC: 53 U/L (ref 40–129)
ALT SERPL W P-5'-P-CCNC: 17 U/L (ref 10–50)
ANION GAP SERPL CALCULATED.3IONS-SCNC: 9 MMOL/L (ref 7–15)
AST SERPL W P-5'-P-CCNC: 23 U/L (ref 10–50)
BILIRUB SERPL-MCNC: 1.3 MG/DL
BUN SERPL-MCNC: 12.6 MG/DL (ref 8–23)
CALCIUM SERPL-MCNC: 9.8 MG/DL (ref 8.8–10.2)
CHLORIDE SERPL-SCNC: 98 MMOL/L (ref 98–107)
CHOLEST SERPL-MCNC: 201 MG/DL
CREAT SERPL-MCNC: 0.83 MG/DL (ref 0.67–1.17)
DEPRECATED HCO3 PLAS-SCNC: 29 MMOL/L (ref 22–29)
ERYTHROCYTE [DISTWIDTH] IN BLOOD BY AUTOMATED COUNT: 13.1 % (ref 10–15)
GFR SERPL CREATININE-BSD FRML MDRD: >90 ML/MIN/1.73M2
GLUCOSE SERPL-MCNC: 100 MG/DL (ref 70–99)
HCT VFR BLD AUTO: 45.3 % (ref 40–53)
HDLC SERPL-MCNC: 94 MG/DL
HGB BLD-MCNC: 15.3 G/DL (ref 13.3–17.7)
LDLC SERPL CALC-MCNC: 96 MG/DL
MCH RBC QN AUTO: 30.8 PG (ref 26.5–33)
MCHC RBC AUTO-ENTMCNC: 33.8 G/DL (ref 31.5–36.5)
MCV RBC AUTO: 91 FL (ref 78–100)
NONHDLC SERPL-MCNC: 107 MG/DL
PLATELET # BLD AUTO: 190 10E3/UL (ref 150–450)
POTASSIUM SERPL-SCNC: 4.3 MMOL/L (ref 3.4–5.3)
PROT SERPL-MCNC: 7.3 G/DL (ref 6.4–8.3)
RBC # BLD AUTO: 4.97 10E6/UL (ref 4.4–5.9)
SODIUM SERPL-SCNC: 136 MMOL/L (ref 136–145)
TRIGL SERPL-MCNC: 54 MG/DL
WBC # BLD AUTO: 4.5 10E3/UL (ref 4–11)

## 2022-09-23 PROCEDURE — 80053 COMPREHEN METABOLIC PANEL: CPT | Performed by: INTERNAL MEDICINE

## 2022-09-23 PROCEDURE — 91312 COVID-19,PF,PFIZER BOOSTER BIVALENT: CPT | Performed by: INTERNAL MEDICINE

## 2022-09-23 PROCEDURE — 85027 COMPLETE CBC AUTOMATED: CPT | Performed by: INTERNAL MEDICINE

## 2022-09-23 PROCEDURE — G0008 ADMIN INFLUENZA VIRUS VAC: HCPCS | Performed by: INTERNAL MEDICINE

## 2022-09-23 PROCEDURE — 0124A COVID-19,PF,PFIZER BOOSTER BIVALENT: CPT | Performed by: INTERNAL MEDICINE

## 2022-09-23 PROCEDURE — 80061 LIPID PANEL: CPT | Performed by: INTERNAL MEDICINE

## 2022-09-23 PROCEDURE — 36415 COLL VENOUS BLD VENIPUNCTURE: CPT | Performed by: INTERNAL MEDICINE

## 2022-09-23 PROCEDURE — G0439 PPPS, SUBSEQ VISIT: HCPCS | Performed by: INTERNAL MEDICINE

## 2022-09-23 PROCEDURE — 90662 IIV NO PRSV INCREASED AG IM: CPT | Performed by: INTERNAL MEDICINE

## 2022-09-23 RX ORDER — LISINOPRIL 20 MG/1
20 TABLET ORAL DAILY
Qty: 90 TABLET | Refills: 3 | Status: SHIPPED | OUTPATIENT
Start: 2022-09-23 | End: 2023-08-23

## 2022-09-23 RX ORDER — LISINOPRIL 30 MG/1
30 TABLET ORAL DAILY
Qty: 90 TABLET | Refills: 4 | Status: CANCELLED | OUTPATIENT
Start: 2022-09-23

## 2022-09-23 RX ORDER — FAMOTIDINE 10 MG
10 TABLET ORAL DAILY PRN
COMMUNITY

## 2022-09-23 ASSESSMENT — ENCOUNTER SYMPTOMS
FEVER: 0
NERVOUS/ANXIOUS: 0
CHILLS: 0
DIZZINESS: 0
HEMATURIA: 0
HEMATOCHEZIA: 0
FREQUENCY: 0
HEADACHES: 0
SORE THROAT: 0
COUGH: 0
CONSTIPATION: 0
DIARRHEA: 0
PARESTHESIAS: 0
ABDOMINAL PAIN: 0
HEARTBURN: 0
ARTHRALGIAS: 0
PALPITATIONS: 0
JOINT SWELLING: 0
EYE PAIN: 0
DYSURIA: 0
MYALGIAS: 0
WEAKNESS: 0
SHORTNESS OF BREATH: 0
NAUSEA: 0

## 2022-09-23 ASSESSMENT — ACTIVITIES OF DAILY LIVING (ADL): CURRENT_FUNCTION: NO ASSISTANCE NEEDED

## 2022-09-23 NOTE — PATIENT INSTRUCTIONS
Annual wellness visit  Since our previous meeting of September 2021, Roland has been diagnosed with localized prostate cancer, and will be starting combined modality external beam radiation and hormonal treatment, under the guidance of Dr. Pham here at [facility] oncology.    Roland is otherwise feeling pretty well.  He did start on Flomax to try to help bladder emptying in the context context of an enlarged prostate gland.  He may be experiencing some orthostatic hypotension symptoms from the Flomax, so I am going to advise Roland to reduce the lisinopril down to 20 mg once a day, attention to maintaining adequate hydration, perhaps experiment with taking the Flomax at different times of the day to see if he can alleviate side effects while still experiencing the symptomatic benefit in terms of bladder emptying.    We will do routine lab work this morning for lipid panel, comprehensive metabolic panel, and blood cell counts.    Administer Moderna bivalent vaccine and also seasonal flu vaccine.    Issues are as follows    Localized prostate cancer, and will be starting combined modality external beam radiation and hormonal treatment, [facility]    Small prostate nodule on the left side which I palpated September 2021  PSA was measured 4.11 on 4/25/2022.  Patient underwent transrectal ultrasound study and biopsy on 7/19/2022.  The pathology showed adenocarcinoma, Sirena score 4+3 = 7 involving 70% biopsy tissue in the left lateral apex and Sirena score 3+3 = 6 involving up to 15% biopsy tissue on the left side.  There is also evidence of perineural invasion.  Staging work-up including bone scan and CT abdomen pelvics showed no evidence of lymph nodes and bone metastasis.  You have discussed with the patient about various treatment options for the prostate cancer.  The patient was initially seen and evaluated on 8/23/2022.  Patient then had staging MRI prostate on  9/12/2022.  This showed PI-RADS 5 lesion in the left posterior lateral peripheral zone with no evidence of lymph nodes and bone metastasis.      6-month hormone therapy and definitive external beam radiation therapy (7020 cGY in 26 treatments) targeted to the prostate gland only at this time    9-  PSA Tumor Marker 0.00 - 6.50 ng/mL 7.30 High       Bladder obstructive symptoms from enlarged prostate gland, I think it is okay for him to continue trying the Flomax (tamsulosin) to see if it achieves a net benefit in terms of comfortable bladder function, while avoiding side effects    I told Roland that as experiments with changing the timing of the tamsulosin dose, be careful to avoid dangerous physical activities where he might feel faint and have a fall or accident.    Diverticulosis and small right inguinal hernia, which were incidental findings on his CT scan abdomen August 5, 2022.  No inflammation was seen associated with the diverticula.  The hernias not symptomatically bothersome for him.    Constipation-- not a problem now  MiraLAX seem to help      Negative Cologuard test September 22, 2020, good for 3 years.  He is never had a colonoscopy.   COLOGUARD_EXT    Negative      Hernia inguinal right not too bothersome, size approximate 3-4 cm, easily reducible  Noticed more as he lost weight  Hernia is approximately 3-4 cm, easily reducible, does not enter the scrotum.     He does not seem to be bothering him too much.  I told him that if it does start to become bothersome, please let me know, then I would send him for general surgery consultation which then might lead to surgical repair which could be done using a laparoscopic robotic technique, as an outpatient procedure, with excellent recovery time.     Benign essential hypertension, September 23, 2022 we will reduce lisinopril dose from 30 down to 20 mg once a day, since he will probably continue with concomitant tamsulosin for his bladder, and that  has some blood pressure lowering effects.    He stopped an herbal tea that may have contained a stimulant  Home machine: average 126.76          BP Readings from Last 6 Encounters:   09/23/22 138/84   09/16/22 (!) 147/72   08/23/22 (!) 162/80   09/07/21 132/78   01/06/21 (!) 168/100   08/27/20 132/80     Mildly overweight  BMI now 27  Diet: breakfast yogurt and fruit, biscuit with fiber  Lunch salad  Supper control portion size     Wt Readings from Last 5 Encounters:   09/23/22 83.5 kg (184 lb)   09/16/22 84.8 kg (186 lb 14.4 oz)   08/23/22 83.6 kg (184 lb 4.8 oz)   09/07/21 80.3 kg (177 lb)   01/06/21 79.6 kg (175 lb 8 oz)     Gastroesophageal reflux, occasional famotidine    Sleep difficulties, may be associated tamsulosin, thus we will try changing the timing of the dose.  I reminded him about nonpharmacologic things that influence sleep including his suppertime, including content and timing of the meal, best to avoid alcohol, exercise helps with sleep, consider an evening relaxation routine.     Basal cell carcinoma, removed by Mohs surgery [facility] November 2020    Varicose veins right leg, history of vein stripping surgery in approximately 1998, not symptomatically bothersome, but he does have compression stockings that I encouraged him to use     Pneumococcal vaccines are done.  He could consider getting the 2 shot recombinant shingles vaccine at a community pharmacy since that is paid under the Medicare prescription drug benefit  Seasonal flu shot and Moderna bivalent booster to be administered September 23, 2022    Pneumo Conj 13-V (2010&after) 11/16/2018   Pneumococcal, Unspecified 01/23/2020      History of cervical radiculopathic neck pain November 2019 which resolved after administration of a steroid Dosepak.

## 2022-09-23 NOTE — PROGRESS NOTES
"SUBJECTIVE:   Roland is a 72 year old who presents for Preventive Visit.      Patient has been advised of split billing requirements and indicates understanding: Yes  Are you in the first 12 months of your Medicare coverage?      Healthy Habits:     In general, how would you rate your overall health?  Good    Frequency of exercise:  2-3 days/week    Duration of exercise:  30-45 minutes    Do you usually eat at least 4 servings of fruit and vegetables a day, include whole grains    & fiber and avoid regularly eating high fat or \"junk\" foods?  Yes    Taking medications regularly:  Yes    Medication side effects:  Lightheadedness    Ability to successfully perform activities of daily living:  No assistance needed    Home Safety:  No safety concerns identified    Hearing Impairment:  Difficulty following a conversation in a noisy restaurant or crowded room    In the past 6 months, have you been bothered by leaking of urine?  No    In general, how would you rate your overall mental or emotional health?  Good      PHQ-2 Total Score: 0    Additional concerns today:  Yes    Do you feel safe in your environment? Yes    Have you ever done Advance Care Planning? (For example, a Health Directive, POLST, or a discussion with a medical provider or your loved ones about your wishes): No, advance care planning information given to patient to review.  Patient declined advance care planning discussion at this time.       Fall risk  1    Cognitive Screening   1) Repeat 3 items (Leader, Season, Table)    2) Clock draw: NORMAL  3) 3 item recall: Recalls 2 objects   Results: NORMAL clock, 1-2 items recalled: COGNITIVE IMPAIRMENT LESS LIKELY    Mini-CogTM Copyright KEVAN Fragoso. Licensed by the author for use in [facility]; reprinted with permission (fe@.[facility]). All rights reserved.      Do you have sleep apnea, excessive snoring or daytime drowsiness?: no    Reviewed and updated as needed this visit by clinical staff   " Tobacco  Allergies  Meds                Reviewed and updated as needed this visit by Provider     Meds               Social History     Tobacco Use     Smoking status: Former Smoker     Smokeless tobacco: Never Used   Substance Use Topics     Alcohol use: Yes     Alcohol/week: 3.0 standard drinks     If you drink alcohol do you typically have >3 drinks per day or >7 drinks per week? No    Alcohol Use 9/23/2022   Prescreen: >3 drinks/day or >7 drinks/week? -   Prescreen: >3 drinks/day or >7 drinks/week? No   Current providers sharing in care for this patient include:   Patient Care Team:  True Mahajan MD as PCP - General (Internal Medicine)  True Mahajan MD as Assigned PCP  Pato Toure MD as MD (Urology)  Ciarra Pham MD as MD (Radiation Oncology)  Ciarra Pham MD as Assigned Cancer Care Provider    The following health maintenance items are reviewed in Epic and correct as of today:  Health Maintenance   Topic Date Due     ANNUAL REVIEW OF HM ORDERS  Never done     ZOSTER IMMUNIZATION (1 of 2) Never done     LUNG CANCER SCREENING  Never done     COVID-19 Vaccine (5 - Booster for Pfizer series) 06/26/2022     INFLUENZA VACCINE (1) 09/01/2022     COLORECTAL CANCER SCREENING  09/22/2023     MEDICARE ANNUAL WELLNESS VISIT  09/23/2023     FALL RISK ASSESSMENT  09/23/2023     LIPID  09/07/2026     ADVANCE CARE PLANNING  09/23/2027     DTAP/TDAP/TD IMMUNIZATION (2 - Td or Tdap) 08/23/2029     HEPATITIS C SCREENING  Completed     PHQ-2 (once per calendar year)  Completed     Pneumococcal Vaccine: 65+ Years  Completed     AORTIC ANEURYSM SCREENING (SYSTEM ASSIGNED)  Completed     IPV IMMUNIZATION  Aged Out     MENINGITIS IMMUNIZATION  Aged Out     HEPATITIS B IMMUNIZATION  Aged Out     Lab work is in process  Review of Systems   Constitutional: Negative for chills and fever.   HENT: Negative for congestion, ear pain, hearing loss and sore throat.    Eyes: Negative for pain and visual disturbance.  "  Respiratory: Negative for cough and shortness of breath.    Cardiovascular: Negative for chest pain, palpitations and peripheral edema.   Gastrointestinal: Negative for abdominal pain, constipation, diarrhea, heartburn, hematochezia and nausea.   Genitourinary: Positive for urgency. Negative for dysuria, frequency, genital sores, hematuria, impotence and penile discharge.   Musculoskeletal: Negative for arthralgias, joint swelling and myalgias.   Skin: Negative for rash.   Neurological: Negative for dizziness, weakness, headaches and paresthesias.   Psychiatric/Behavioral: Negative for mood changes. The patient is not nervous/anxious.        OBJECTIVE:   /84 (BP Location: Right arm, Patient Position: Sitting, Cuff Size: Adult Regular)   Pulse 55   Ht 1.74 m (5' 8.5\")   Wt 83.5 kg (184 lb)   SpO2 99%   BMI 27.57 kg/m   Estimated body mass index is 27.57 kg/m  as calculated from the following:    Height as of this encounter: 1.74 m (5' 8.5\").    Weight as of this encounter: 83.5 kg (184 lb).  Physical Exam    General: Alert, in no distress  Skin: No significant lesion seen.  Eyes/nose/throat: Eyes without scleral icterus, eye movements normal, pupils equal and reactive, oropharynx clear, ears with normal TM's  MSK: Neck with good ROM  Lymphatic: Neck without adenopathy or masses  Endocrine: Thyroid with no nodules to palpation  Pulm: Lungs clear to auscultation bilaterally  Cardiac: Heart with regular rate and rhythm, no murmur or gallop  GI: Abdomen soft, nontender. No palpable enlargement of liver or spleen  MSK: Extremities no tenderness or edema  Neuro: Moves all extremities, without focal weakness  Psych: Alert, normal mental status. Normal affect and speech      ASSESSMENT / PLAN:       Annual wellness visit  Since our previous meeting of September 2021, Roland has been diagnosed with localized prostate cancer, and will be starting combined modality external beam radiation and hormonal treatment, " under the guidance of Dr. Pham here at [facility] oncology.    Roland is otherwise feeling pretty well.  He did start on Flomax to try to help bladder emptying in the context context of an enlarged prostate gland.  He may be experiencing some orthostatic hypotension symptoms from the Flomax, so I am going to advise Roland to reduce the lisinopril down to 20 mg once a day, attention to maintaining adequate hydration, perhaps experiment with taking the Flomax at different times of the day to see if he can alleviate side effects while still experiencing the symptomatic benefit in terms of bladder emptying.    We will do routine lab work this morning for lipid panel, comprehensive metabolic panel, and blood cell counts.    Administer Moderna bivalent vaccine and also seasonal flu vaccine.    Issues are as follows    Localized prostate cancer, and will be starting combined modality external beam radiation and hormonal treatment, under the guidance of Dr. Pham here at [facility] oncology.    Small prostate nodule on the left side which I palpated September 2021  PSA was measured 4.11 on 4/25/2022.  Patient underwent transrectal ultrasound study and biopsy on 7/19/2022.  The pathology showed adenocarcinoma, [place]es score 4+3 = 7 involving 70% biopsy tissue in the left lateral apex and [place]es score 3+3 = 6 involving up to 15% biopsy tissue on the left side.  There is also evidence of perineural invasion.  Staging work-up including bone scan and CT abdomen pelvics showed no evidence of lymph nodes and bone metastasis.  You have discussed with the patient about various treatment options for the prostate cancer.  The patient was initially seen and evaluated on 8/23/2022.  Patient then had staging MRI prostate on 9/12/2022.  This showed PI-RADS 5 lesion in the left posterior lateral peripheral zone with no evidence of lymph nodes and bone metastasis.      6-month hormone therapy and definitive external beam radiation therapy  (7020 cGY in 26 treatments) targeted to the prostate gland only at this time    9-  PSA Tumor Marker 0.00 - 6.50 ng/mL 7.30 High       Bladder obstructive symptoms from enlarged prostate gland, I think it is okay for him to continue trying the Flomax (tamsulosin) to see if it achieves a net benefit in terms of comfortable bladder function, while avoiding side effects    I told Roland that as experiments with changing the timing of the tamsulosin dose, be careful to avoid dangerous physical activities where he might feel faint and have a fall or accident.    Diverticulosis and small right inguinal hernia, which were incidental findings on his CT scan abdomen August 5, 2022.  No inflammation was seen associated with the diverticula.  The hernias not symptomatically bothersome for him.    Constipation-- not a problem now  MiraLAX seem to help      Negative Cologuard test September 22, 2020, good for 3 years.  He is never had a colonoscopy.   COLOGUARD_EXT    Negative      Hernia inguinal right not too bothersome, size approximate 3-4 cm, easily reducible  Noticed more as he lost weight  Hernia is approximately 3-4 cm, easily reducible, does not enter the scrotum.     He does not seem to be bothering him too much.  I told him that if it does start to become bothersome, please let me know, then I would send him for general surgery consultation which then might lead to surgical repair which could be done using a laparoscopic robotic technique, as an outpatient procedure, with excellent recovery time.     Benign essential hypertension, September 23, 2022 we will reduce lisinopril dose from 30 down to 20 mg once a day, since he will probably continue with concomitant tamsulosin for his bladder, and that has some blood pressure lowering effects.    He stopped an herbal tea that may have contained a stimulant  Home machine: average 126.76      BP Readings from Last 6 Encounters:   09/23/22 138/84   09/16/22 (!) 147/72  "  08/23/22 (!) 162/80   09/07/21 132/78   01/06/21 (!) 168/100   08/27/20 132/80     Mildly overweight  BMI now 27  Diet: breakfast yogurt and fruit, biscuit with fiber  Lunch salad  Supper control portion size     Wt Readings from Last 5 Encounters:   09/23/22 83.5 kg (184 lb)   09/16/22 84.8 kg (186 lb 14.4 oz)   08/23/22 83.6 kg (184 lb 4.8 oz)   09/07/21 80.3 kg (177 lb)   01/06/21 79.6 kg (175 lb 8 oz)     Gastroesophageal reflux, occasional famotidine    Sleep difficulties, may be associated tamsulosin, thus we will try changing the timing of the dose.  I reminded him about nonpharmacologic things that influence sleep including his suppertime, including content and timing of the meal, best to avoid alcohol, exercise helps with sleep, consider an evening relaxation routine.     Basal cell carcinoma, removed by Mohs surgery [facility] November 2020    Varicose veins right leg, history of vein stripping surgery in approximately 1998, not symptomatically bothersome, but he does have compression stockings that I encouraged him to use     Pneumococcal vaccines are done.  He could consider getting the 2 shot recombinant shingles vaccine at a community pharmacy since that is paid under the Medicare prescription drug benefit  Seasonal flu shot and Moderna bivalent booster to be administered September 23, 2022    Pneumo Conj 13-V (2010&after) 11/16/2018   Pneumococcal, Unspecified 01/23/2020      History of cervical radiculopathic neck pain November 2019 which resolved after administration of a steroid Dosepak.      COUNSELING:  Reviewed preventive health counseling, as reflected in patient instructions       Healthy diet/nutrition    Estimated body mass index is 27.57 kg/m  as calculated from the following:    Height as of this encounter: 1.74 m (5' 8.5\").    Weight as of this encounter: 83.5 kg (184 lb).        He reports that he has quit smoking. He has never used smokeless tobacco.      Appropriate " preventive services were discussed with this patient, including applicable screening as appropriate for cardiovascular disease, diabetes, osteopenia/osteoporosis, and glaucoma.  As appropriate for age/gender, discussed screening for colorectal cancer, prostate cancer, breast cancer, and cervical cancer. Checklist reviewing preventive services available has been given to the patient.    Reviewed patients plan of care and provided an AVS. The Basic Care Plan (routine screening as documented in Health Maintenance) for Roland meets the Care Plan requirement. This Care Plan has been established and reviewed with the Patient.        OLGA CASTAÑEDA MD  [facility]    Identified Health Risks:

## 2022-09-28 ENCOUNTER — OFFICE VISIT (OUTPATIENT)
Dept: RADIATION ONCOLOGY | Facility: CLINIC | Age: 72
End: 2022-09-28
Attending: RADIOLOGY
Payer: COMMERCIAL

## 2022-09-28 VITALS
WEIGHT: 185.7 LBS | RESPIRATION RATE: 16 BRPM | TEMPERATURE: 97.6 F | HEART RATE: 80 BPM | SYSTOLIC BLOOD PRESSURE: 158 MMHG | DIASTOLIC BLOOD PRESSURE: 70 MMHG | OXYGEN SATURATION: 99 % | BODY MASS INDEX: 27.82 KG/M2

## 2022-09-28 DIAGNOSIS — C61 MALIGNANT NEOPLASM OF PROSTATE (H): Primary | ICD-10-CM

## 2022-09-28 PROCEDURE — G0463 HOSPITAL OUTPT CLINIC VISIT: HCPCS

## 2022-09-28 PROCEDURE — 77470 SPECIAL RADIATION TREATMENT: CPT | Performed by: RADIOLOGY

## 2022-09-28 PROCEDURE — 99215 OFFICE O/P EST HI 40 MIN: CPT | Mod: 25 | Performed by: RADIOLOGY

## 2022-09-28 PROCEDURE — 77263 THER RADIOLOGY TX PLNG CPLX: CPT | Performed by: RADIOLOGY

## 2022-09-28 RX ORDER — BICALUTAMIDE 50 MG/1
50 TABLET, FILM COATED ORAL DAILY
Qty: 20 TABLET | Refills: 0 | Status: SHIPPED | OUTPATIENT
Start: 2022-09-28 | End: 2022-12-06

## 2022-09-28 NOTE — PROGRESS NOTES
[facility] Radiation Oncology Follow Up     Patient: Roland Dejesus  MRN: [id number]  Date of Service: 09/28/2022           Mr. Dejesus is a 72 year old male who is a retired  and has been in his usual state of good health until recently.  The patient presented with recent history of elevation of PSA to around 4-5 level with rectal examination showed palpable prostate nodule for which he was seeking further evaluation.  His last PSA was measured 4.11 on 4/25/2022.  Patient underwent transrectal ultrasound study and biopsy on 7/19/2022.  The pathology showed adenocarcinoma, [place] score 4+3 = 7 involving 70% biopsy tissue in the left lateral apex and Sirena score 3+3 = 6 involving up to 15% biopsy tissue on the left side.  There is also evidence of perineural invasion.  Staging work-up including bone scan and CT abdomen pelvics showed no evidence of lymph nodes and bone metastasis.  You have discussed with the patient about various treatment options for the prostate cancer.  The patient was initially seen and evaluated on 8/23/2022.  Patient then had staging MRI prostate on 9/12/2022.  This showed PI-RADS 5 lesion in the left posterior lateral peripheral zone with no evidence of lymph nodes and bone metastasis.  The patient is here for further evaluation and discussion of treatment options.     CHEMOTHERAPY HISTORY: Concurrent Chemotherapy: No     RADIATION THERAPY HISTORY: Prior Radiation: No     IMPLANTED CARDIAC DEVICE: none      Imaging:  Reviewed     Prostate Specific Antigen Screen   Date Value Ref Range Status   04/25/2022 4.11 0.00 - 6.50 ug/L Final   09/07/2021 5.03 0.00 - 6.50 ug/L Final   08/27/2020 3.6 0.0 - 6.5 ng/mL Final   08/23/2019 4.2 0.0 - 4.5 ng/mL Final     PSA Tumor Marker   Date Value Ref Range Status   09/16/2022 7.30 (H) 0.00 - 6.50 ng/mL Final        Assessment / Impression      Unfavorable intermediate risk prostate cancer, clinical stage T2 a N0 M0, [place] grade 4+3 = 7  and 3+3 = 6 involving left side of prostate gland and pretherapy PSA of 7.3.  Staging work-up including MRI, CT abdomen pelvics and bone scan showed no evidence of metastatic disease.     Plan:      I have personally reviewed his upcoming medical record today.  I have also discussed with the patient about all the possible treatment options for the prostate cancer including clinical observation, hormonal therapy, surgery, cryotherapy, and radiation therapy in the form of radioactive seed implant and SBRT/IMRT/IGRT/TomoTherapy.  The NCCN guideline for prostate cancer treatment recommendation has also been reviewed with the patient.  The possible risks and the side effects of radiation therapy have been discussed with the patient in detail and at a great length.  Patient is also informed that all the curative options have equal chance of cure for his prostate cancer with different side effect profile.  The patient has to make his own decision regarding treatment choice.  He seems to understand it well. Given the status of T2a disease, Morris grade 7 and a pretherapy PSA of 7.3, I think the patient has approximately 39-51% chance of organ-confined disease, 33-45% chance to have capsular penetration, 7-16% risk of seminal vesicle involvement and 2-8% risk of lymph node metastasis based on Dr. Pretty's table.  Given the evidence of Morris 7 disease and the good health status, I think it is reasonable to consider some form of definitive therapy.  His PSA continue to going up and measures 7.3 on 9/16/2022.  The patient elected to proceed with 6-month hormone therapy and definitive external beam radiation therapy (7020 cGY in 26 treatments) targeted to the prostate gland only as his treatment of choice for his prostate cancer being aware of potential risks and side effects involved.  We will schedule patient to have his 6 months Lupron injection in next 2 weeks and contact Dr. Toure office to set up time for markers and  SpaceOAR [place] placement.  The patient is informed to contact radiation oncology 1 week after markers placement to set up time for simulation.       I spent approximately 40 minutes today with the patient and 80% time was used for counseling.         Ciarra Pham MD, PhD  Department of Radiation Oncology   [facility]  Tel: [phone]  Page: [phone]    [facility]  [address] [place] [address]     [address] [place] [address]    CC:  Patient Care Team:  True Mahajan MD as PCP - General (Internal Medicine)  True Mahajan MD as Assigned PCP  aPto Toure MD as MD (Urology)  Ciarra Pham MD as MD (Radiation Oncology)  Ciarra Pham MD as Assigned Cancer Care Provider

## 2022-09-28 NOTE — PROGRESS NOTES
"Patient here ambulatory for follow up on his prostate cancer.  Seen by Dr. Pham.  Plan return to clinic as directed by physician.    Oncology Rooming Note    September 28, 2022 8:16 AM   Roland Dejesus is a 72 year old male who presents for:    Chief Complaint   Patient presents with     Oncology Clinic Visit     Follow up with Dr. Pham     Initial Vitals: BP (!) 158/70 (BP Location: Right arm, Patient Position: Sitting, Cuff Size: Adult Regular)   Pulse 80   Temp 97.6  F (36.4  C) (Oral)   Resp 16   Wt 84.2 kg (185 lb 11.2 oz)   SpO2 99%   BMI 27.82 kg/m   Estimated body mass index is 27.82 kg/m  as calculated from the following:    Height as of 9/23/22: 1.74 m (5' 8.5\").    Weight as of this encounter: 84.2 kg (185 lb 11.2 oz). Body surface area is 2.02 meters squared.  No Pain (0) Comment: Data Unavailable   No LMP for male patient.  Allergies reviewed: Yes  Medications reviewed: Yes    Medications: Medication refills not needed today.  Pharmacy name entered into [email or web address]: [id number] [address] [facility]    Clinical concerns: Patient trying to decide on treatment option Dr. Pham was notified.      Radha Pagan RN            "

## 2022-09-28 NOTE — LETTER
9/28/2022         RE: Roland Dejesus  [address] [address]        Dear Colleague,    Thank you for referring your patient, Roland Dejesus, to the [facility] RADIATION ONCOLOGY [place]. Please see a copy of my visit note below.    [facility] Radiation Oncology Follow Up     Patient: Roland Dejesus  MRN: [id number]  Date of Service: 09/28/2022           Mr. Dejesus is a 72 year old male who is a retired  and has been in his usual state of good health until recently.  The patient presented with recent history of elevation of PSA to around 4-5 level with rectal examination showed palpable prostate nodule for which he was seeking further evaluation.  His last PSA was measured 4.11 on 4/25/2022.  Patient underwent transrectal ultrasound study and biopsy on 7/19/2022.  The pathology showed adenocarcinoma, Sirena score 4+3 = 7 involving 70% biopsy tissue in the left lateral apex and Sirena score 3+3 = 6 involving up to 15% biopsy tissue on the left side.  There is also evidence of perineural invasion.  Staging work-up including bone scan and CT abdomen pelvics showed no evidence of lymph nodes and bone metastasis.  You have discussed with the patient about various treatment options for the prostate cancer.  The patient was initially seen and evaluated on 8/23/2022.  Patient then had staging MRI prostate on 9/12/2022.  This showed PI-RADS 5 lesion in the left posterior lateral peripheral zone with no evidence of lymph nodes and bone metastasis.  The patient is here for further evaluation and discussion of treatment options.     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  Staging work-up including MRI, CT abdomen pelvics and bone scan showed no evidence of metastatic disease.     Plan:      I have personally reviewed his upcoming medical record today.  I have also discussed with the patient about all the possible treatment options for the prostate cancer including clinical observation, hormonal therapy, surgery, cryotherapy, and radiation therapy in the form of radioactive seed implant and SBRT/IMRT/IGRT/TomoTherapy.  The NCCN guideline for prostate cancer treatment recommendation has also been reviewed with the patient.  The possible risks and the side effects of radiation therapy have been discussed with the patient in detail and at a great length.  Patient is also informed that all the curative options have equal chance of cure for his prostate cancer with different side effect profile.  The patient has to make his own decision regarding treatment choice.  He seems to understand it well. Given the status of T2a disease, [place] grade 7 and a pretherapy PSA of 7.3, I think the patient has approximately 39-51% chance of organ-confined disease, 33-45% chance to have capsular penetration, 7-16% risk of seminal vesicle involvement and 2-8% risk of lymph node metastasis based on Dr. Pretty's table.  Given the evidence of Sirena 7 disease and the good health status, I think it is reasonable to consider some form of definitive therapy.  His PSA continue to going up and measures 7.3 on 9/16/2022.  The patient elected to proceed with 6-month hormone therapy and definitive external beam radiation therapy (7020 cGY in 26 treatments) targeted to the  "prostate gland only as his treatment of choice for his prostate cancer being aware of potential risks and side effects involved.  We will schedule patient to have his 6 months Lupron injection in next 2 weeks and contact Dr. Toure office to set up time for markers and SpaceO[employer] [place] placement.  The patient is informed to contact radiation oncology 1 week after markers placement to set up time for simulation.       I spent approximately 40 minutes today with the patient and 80% time was used for counseling.         Ciarra Pham MD, PhD  Department of Radiation Oncology   [facility]  Tel: [phone]  Page: [phone]    [facility]  [address] [place] [address]     [facility]   [address] [place] [address]    CC:  Patient Care Team:  True Mahajan MD as PCP - General (Internal Medicine)  True Mahajan MD as Assigned PCP  Pato Toure MD as MD (Urology)  Cairra Pham MD as MD (Radiation Oncology)  Ciarra Pham MD as Assigned Cancer Care Provider      Patient here ambulatory for follow up on his prostate cancer.  Seen by Dr. Pham.  Plan return to clinic as directed by physician.    Oncology Rooming Note    September 28, 2022 8:16 AM   Roland Dejesus is a 72 year old male who presents for:    Chief Complaint   Patient presents with     Oncology Clinic Visit     Follow up with Dr. Pham     Initial Vitals: BP (!) 158/70 (BP Location: Right arm, Patient Position: Sitting, Cuff Size: Adult Regular)   Pulse 80   Temp 97.6  F (36.4  C) (Oral)   Resp 16   Wt 84.2 kg (185 lb 11.2 oz)   SpO2 99%   BMI 27.82 kg/m   Estimated body mass index is 27.82 kg/m  as calculated from the following:    Height as of 9/23/22: 1.74 m (5' 8.5\").    Weight as of this encounter: 84.2 kg (185 lb 11.2 oz). Body surface area is 2.02 meters squared.  No Pain (0) Comment: Data Unavailable   No LMP for male patient.  Allergies reviewed: Yes  Medications reviewed: Yes    Medications: " Medication refills not needed today.  Pharmacy name entered into [employer]: [id number] [place], [place] - [address]    Clinical concerns: Patient trying to decide on treatment option Dr. Pham was notified.      Radha Pagan RN                Again, thank you for allowing me to participate in the care of your patient.        Sincerely,        Ciarra Pham MD

## 2022-10-19 ENCOUNTER — INFUSION THERAPY VISIT (OUTPATIENT)
Dept: INFUSION THERAPY | Facility: CLINIC | Age: 72
End: 2022-10-19
Attending: RADIOLOGY
Payer: COMMERCIAL

## 2022-10-19 VITALS
OXYGEN SATURATION: 98 % | HEART RATE: 75 BPM | DIASTOLIC BLOOD PRESSURE: 93 MMHG | SYSTOLIC BLOOD PRESSURE: 154 MMHG | TEMPERATURE: 97.6 F

## 2022-10-19 DIAGNOSIS — C61 MALIGNANT NEOPLASM OF PROSTATE (H): Primary | ICD-10-CM

## 2022-10-19 PROCEDURE — 96402 CHEMO HORMON ANTINEOPL SQ/IM: CPT

## 2022-10-19 PROCEDURE — 250N000011 HC RX IP 250 OP 636: Performed by: RADIOLOGY

## 2022-10-19 RX ADMIN — LEUPROLIDE ACETATE 45 MG: KIT at 13:12

## 2022-10-19 NOTE — PROGRESS NOTES
Infusion Nursing Note:  Roland Dejesus presents today for leupron injection.    Patient seen by provider today: No   present during visit today: Not Applicable.    Note: pt started on casodex oral meds today when he got his leupron injection. Pt injection given in right deltoid and information sheet with side effects given to pt. Pt will start radiation in about 6 weeks.    Intravenous Access:  No Intravenous access/labs at this visit.    Treatment Conditions:  Not Applicable.    Post Infusion Assessment:  Patient tolerated injection without incident. Pt nervous about injection.    Discharge Plan:   Patient and/or family verbalized understanding of discharge instructions and all questions answered.      Adry Craig RN

## 2022-11-08 ENCOUNTER — TRANSFERRED RECORDS (OUTPATIENT)
Dept: HEALTH INFORMATION MANAGEMENT | Facility: CLINIC | Age: 72
End: 2022-11-08

## 2022-11-17 ENCOUNTER — ALLIED HEALTH/NURSE VISIT (OUTPATIENT)
Dept: RADIATION ONCOLOGY | Facility: HOSPITAL | Age: 72
End: 2022-11-17
Attending: RADIOLOGY
Payer: COMMERCIAL

## 2022-11-17 NOTE — PROCEDURES
Clinical Treatment Planning Note    The complex radiotherapy planning will be completed for the patient for his prostate cancer.  The patient had a planning CT earlier today for planning.  The treatment aids were used for planning, including vac-loc immobilization.  The therapy planning is necessary to reduce radiotherapy dose to the normal critical structures which is not possible with simple treatment planning.  In addition, dose to the target and the critical structures will require three-dimensional analysis of the isodose distribution.  This planning will be done to reduce dose to rectum, small bowel, and the urinary bladder, penile bulb and femoral heads.     I will contour the clinical tumor volume  CTV , with extended volume of planning treatment volume  PTV  on the treatment planning system.  The critical structures will be outlined, including urinary to rectum, small bowel, and urinary bladder, penile bulb, femoral heads, bones, and soft tissue.     The treatment planning will be done on the computer treatment planning system.  I will consider to use multiple gantry positions to achieve optimal PTV coverage.  The dose distribution to the above critical structures will be reviewed.  The isodose distribution along the X, Y, Z plan will be also reviewed.  Custom blocking will be used to shield normal structures.  The beam s eye view will be reviewed and the digital reconstructed imaging will be reviewed on the planning software.  The IMRT/IGRT technique will be used to deliver optimal dose to the tumor and to protect normal tissue.  The patient will receive a total dose of 7020 cGy in 26 treatments targeted to the prostate gland using 6-10 MV photon.       Ciarra Pham MD, PhD  Department of Radiation Oncology   [facility] Radiation Oncology  Tel: [phone]  Page: [phone]    [facility]  [address] [place] [address]     [address] [place] [address]

## 2022-11-17 NOTE — PROCEDURES
SIMULATION NOTE:    DIAGNOSIS: Unfavorable intermediate risk prostate cancer, clinical stage T2 a N0 M0, Bailey grade 4+3 = 7 and 3+3 = 6 involving left side of prostate gland and pretherapy PSA of 7.3.  Staging work-up including MRI, CT abdomen pelvics and bone scan showed no evidence of metastatic disease.    INDICATION:  After discussing with the patient about various treatment options, the patient elected to proceed with 6 months hormone therapy (given on 9/19/2022) and definitive external beam radiation therapy as his treatment of choice for his prostate cancer.    CONSENT:  The possible risks and side effects of radiation therapy have been discussed with the patient in detail and at a great length.  Questions are answered to patient's satisfaction.  The written consent was obtained.    SIMULATION:  The patient is in a supine position with a headrest and a vac loc between the bilateral lower extremities to help keep the same position during the daily radiation therapy.  Tentative isocenter is set up in the center of the pelvic region.  We will acquire CT information to help us to better locate the target and design the radiation therapy field.    BLOCKS:  Custom blocks will be drawn to minimize radiation to normal tissues and to protect normal organs including, but not limited to, urinary bladder, rectum, small bowels, bone and soft tissue.    DOSAGE:  I plan to give him radiation therapy to a total dose of 7020 cGy in 26 treatments targeted to the prostate gland only. I will consider to use IMRT/IGRT technique to help us to better locate the target and to protect normal tissues.      Ciarra Pham MD, PhD  Department of Radiation Oncology   [facility] Radiation Oncology  Tel: [phone]  Page: [phone]    [facility]  [address] [place] [address]     [place] [id number] [facility]   [place] [address]

## 2022-11-28 ENCOUNTER — APPOINTMENT (OUTPATIENT)
Dept: RADIATION ONCOLOGY | Facility: CLINIC | Age: 72
End: 2022-11-28
Attending: RADIOLOGY
Payer: COMMERCIAL

## 2022-11-28 PROCEDURE — 77338 DESIGN MLC DEVICE FOR IMRT: CPT | Mod: 26 | Performed by: RADIOLOGY

## 2022-11-28 PROCEDURE — 77338 DESIGN MLC DEVICE FOR IMRT: CPT | Performed by: RADIOLOGY

## 2022-11-28 PROCEDURE — 77300 RADIATION THERAPY DOSE PLAN: CPT | Mod: 26 | Performed by: RADIOLOGY

## 2022-11-28 PROCEDURE — 77300 RADIATION THERAPY DOSE PLAN: CPT | Performed by: RADIOLOGY

## 2022-11-28 PROCEDURE — 77301 RADIOTHERAPY DOSE PLAN IMRT: CPT | Mod: 26 | Performed by: RADIOLOGY

## 2022-11-28 PROCEDURE — 77301 RADIOTHERAPY DOSE PLAN IMRT: CPT | Performed by: RADIOLOGY

## 2022-11-30 ENCOUNTER — APPOINTMENT (OUTPATIENT)
Dept: RADIATION ONCOLOGY | Facility: CLINIC | Age: 72
End: 2022-11-30
Attending: RADIOLOGY
Payer: COMMERCIAL

## 2022-11-30 PROCEDURE — 77385 HC IMRT TREATMENT DELIVERY, SIMPLE: CPT | Performed by: RADIOLOGY

## 2022-11-30 PROCEDURE — 77014 PR CT GUIDE FOR PLACEMENT RADIATION THERAPY FIELDS: CPT | Mod: 26 | Performed by: RADIOLOGY

## 2022-12-01 ENCOUNTER — APPOINTMENT (OUTPATIENT)
Dept: RADIATION ONCOLOGY | Facility: CLINIC | Age: 72
End: 2022-12-01
Attending: RADIOLOGY
Payer: COMMERCIAL

## 2022-12-01 PROCEDURE — 77014 PR CT GUIDE FOR PLACEMENT RADIATION THERAPY FIELDS: CPT | Mod: 26 | Performed by: STUDENT IN AN ORGANIZED HEALTH CARE EDUCATION/TRAINING PROGRAM

## 2022-12-01 PROCEDURE — 77385 HC IMRT TREATMENT DELIVERY, SIMPLE: CPT | Performed by: STUDENT IN AN ORGANIZED HEALTH CARE EDUCATION/TRAINING PROGRAM

## 2022-12-02 ENCOUNTER — APPOINTMENT (OUTPATIENT)
Dept: RADIATION ONCOLOGY | Facility: CLINIC | Age: 72
End: 2022-12-02
Attending: RADIOLOGY
Payer: COMMERCIAL

## 2022-12-02 PROCEDURE — 77014 PR CT GUIDE FOR PLACEMENT RADIATION THERAPY FIELDS: CPT | Mod: 26 | Performed by: STUDENT IN AN ORGANIZED HEALTH CARE EDUCATION/TRAINING PROGRAM

## 2022-12-02 PROCEDURE — 77385 HC IMRT TREATMENT DELIVERY, SIMPLE: CPT | Performed by: STUDENT IN AN ORGANIZED HEALTH CARE EDUCATION/TRAINING PROGRAM

## 2022-12-05 ENCOUNTER — APPOINTMENT (OUTPATIENT)
Dept: RADIATION ONCOLOGY | Facility: CLINIC | Age: 72
End: 2022-12-05
Attending: RADIOLOGY
Payer: COMMERCIAL

## 2022-12-05 PROCEDURE — 77014 PR CT GUIDE FOR PLACEMENT RADIATION THERAPY FIELDS: CPT | Mod: 26 | Performed by: RADIOLOGY

## 2022-12-05 PROCEDURE — 77385 HC IMRT TREATMENT DELIVERY, SIMPLE: CPT | Performed by: RADIOLOGY

## 2022-12-06 ENCOUNTER — APPOINTMENT (OUTPATIENT)
Dept: RADIATION ONCOLOGY | Facility: CLINIC | Age: 72
End: 2022-12-06
Attending: RADIOLOGY
Payer: COMMERCIAL

## 2022-12-06 VITALS
OXYGEN SATURATION: 99 % | SYSTOLIC BLOOD PRESSURE: 152 MMHG | WEIGHT: 186.8 LBS | DIASTOLIC BLOOD PRESSURE: 80 MMHG | HEART RATE: 66 BPM | RESPIRATION RATE: 16 BRPM | BODY MASS INDEX: 27.99 KG/M2 | TEMPERATURE: 97.7 F

## 2022-12-06 DIAGNOSIS — C61 MALIGNANT NEOPLASM OF PROSTATE (H): Primary | ICD-10-CM

## 2022-12-06 PROCEDURE — 77427 RADIATION TX MANAGEMENT X5: CPT | Performed by: RADIOLOGY

## 2022-12-06 PROCEDURE — 77336 RADIATION PHYSICS CONSULT: CPT | Performed by: RADIOLOGY

## 2022-12-06 PROCEDURE — 77385 HC IMRT TREATMENT DELIVERY, SIMPLE: CPT | Performed by: RADIOLOGY

## 2022-12-06 PROCEDURE — 77014 PR CT GUIDE FOR PLACEMENT RADIATION THERAPY FIELDS: CPT | Mod: 26 | Performed by: RADIOLOGY

## 2022-12-06 RX ORDER — POLYETHYLENE GLYCOL 3350 17 G/17G
1 POWDER, FOR SOLUTION ORAL PRN
COMMUNITY

## 2022-12-06 NOTE — LETTER
2022         RE: Roland Dejesus  [address] [address]        Dear Colleague,    Thank you for referring your patient, Roland Dejesus, to the [facility] RADIATION ONCOLOGY [place]. Please see a copy of my visit note below.      RADIATION ONCOLOGY WEEKLY TREATMENT VISIT NOTE      Assessment / Impression     Malignant neoplasm of prostate (H) [C61]     Tolerating radiation therapy well.  All questions and concerns addressed.    Plan:     Continue radiation treatment as prescribed.    Subjective:      HPI: Roland Dejesus is a 72 year old male with  Malignant neoplasm of prostate (H) [C61]    The following portions of the patient's history were reviewed and updated as appropriate: allergies, current medications, past family history, past medical history, past social history, past surgical history and problem list.    Assessment                  Body Site:  Pelvis  Stereotactic Radiosurgery: No  Concurrent Therapy: Yes (leuprolide, casodex)  Today's Dose: 1350  Total Dose for Pelvis: 7020  Today's Fraction/Total Fraction Pelvis:   Drainage: 0: Absent  Diarrhea W/O Colostomy: 0: None  Constipation: 1: Requiring stool softner or dietary modifation (miralax every other day)  Proctitis: 0: None  Urinary frequency and/or urgency: 0: Normal  Urinary Retention: 1: Hesitancy or dribbling. No sig residual, retention during immediate post op  Urinary Incontinence: 0: None  Dysuria: 0: None  Nocturia: 1-2  Urine Color Change: 0: Normal                                     Sexuality Alteration                    Emotional Alteration    Copin: Effective  Comfort Alteration   KPS: 100 % Normal, no complaints  Fatigue (ONS scale): 0: No Fatigue  Pain Location: left hip/back  Pain Intensity. Rate degree of pain ranging from 0 (no pain) to 10 (severe pain): 2  Pain Description: Dull intermittent - Dull type of ache which is intermittent  Pain Intervention: 1: Over the counter  medications (tylenol)  Effectiveness of pain intervention: 4: Pain relieved 100%   Nutrition Alteration   Anorexia: 0: None  Nausea: 0: None  Vomitin: None  Weight: 84.7 kg (186 lb 12.8 oz)  Skin Alteration   Skin Sensation: 0: No problem  Skin Reaction: 0: None  AUA Assessment                                           Accompanied by       Objective:     Exam: Examination reviewed no significant changes.    Vitals:    22 1346   BP: (!) 152/80   Pulse: 66   Resp: 16   Temp: 97.7  F (36.5  C)   TempSrc: Oral   SpO2: 99%   Weight: 84.7 kg (186 lb 12.8 oz)       Wt Readings from Last 8 Encounters:   22 84.7 kg (186 lb 12.8 oz)   22 84.2 kg (185 lb 11.2 oz)   22 83.5 kg (184 lb)   22 84.8 kg (186 lb 14.4 oz)   22 83.6 kg (184 lb 4.8 oz)   21 80.3 kg (177 lb)   21 79.6 kg (175 lb 8 oz)   20 90.7 kg (199 lb 14.4 oz)       General: Alert and oriented, in no acute distress  Roland has no Erythema.  Aria chart and setup information reviewed    Ciarra Pham MD        Again, thank you for allowing me to participate in the care of your patient.        Sincerely,        Ciarra Pham MD

## 2022-12-06 NOTE — PROGRESS NOTES
RADIATION ONCOLOGY WEEKLY TREATMENT VISIT NOTE      Assessment / Impression     Malignant neoplasm of prostate (H) [C61]     Tolerating radiation therapy well.  All questions and concerns addressed.    Plan:     Continue radiation treatment as prescribed.    Subjective:      HPI: Roland Dejesus is a 72 year old male with  Malignant neoplasm of prostate (H) [C61]    The following portions of the patient's history were reviewed and updated as appropriate: allergies, current medications, past family history, past medical history, past social history, past surgical history and problem list.    Assessment                  Body Site:  Pelvis  Stereotactic Radiosurgery: No  Concurrent Therapy: Yes (leuprolide, casodex)  Today's Dose: 1350  Total Dose for Pelvis: 7020  Today's Fraction/Total Fraction Pelvis:   Drainage: 0: Absent  Diarrhea W/O Colostomy: 0: None  Constipation: 1: Requiring stool softner or dietary modifation (miralax every other day)  Proctitis: 0: None  Urinary frequency and/or urgency: 0: Normal  Urinary Retention: 1: Hesitancy or dribbling. No sig residual, retention during immediate post op  Urinary Incontinence: 0: None  Dysuria: 0: None  Nocturia: 1-2  Urine Color Change: 0: Normal                                     Sexuality Alteration                    Emotional Alteration    Copin: Effective  Comfort Alteration   KPS: 100 % Normal, no complaints  Fatigue (ONS scale): 0: No Fatigue  Pain Location: left hip/back  Pain Intensity. Rate degree of pain ranging from 0 (no pain) to 10 (severe pain): 2  Pain Description: Dull intermittent - Dull type of ache which is intermittent  Pain Intervention: 1: Over the counter medications (tylenol)  Effectiveness of pain intervention: 4: Pain relieved 100%   Nutrition Alteration   Anorexia: 0: None  Nausea: 0: None  Vomitin: None  Weight: 84.7 kg (186 lb 12.8 oz)  Skin Alteration   Skin Sensation: 0: No problem  Skin Reaction: 0:  None  AUA Assessment                                           Accompanied by       Objective:     Exam: Examination reviewed no significant changes.    Vitals:    12/06/22 1346   BP: (!) 152/80   Pulse: 66   Resp: 16   Temp: 97.7  F (36.5  C)   TempSrc: Oral   SpO2: 99%   Weight: 84.7 kg (186 lb 12.8 oz)       Wt Readings from Last 8 Encounters:   12/06/22 84.7 kg (186 lb 12.8 oz)   09/28/22 84.2 kg (185 lb 11.2 oz)   09/23/22 83.5 kg (184 lb)   09/16/22 84.8 kg (186 lb 14.4 oz)   08/23/22 83.6 kg (184 lb 4.8 oz)   09/07/21 80.3 kg (177 lb)   01/06/21 79.6 kg (175 lb 8 oz)   08/27/20 90.7 kg (199 lb 14.4 oz)       General: Alert and oriented, in no acute distress  Roland has no Erythema.  Aria chart and setup information reviewed    Ciarra Pham MD

## 2022-12-07 ENCOUNTER — APPOINTMENT (OUTPATIENT)
Dept: RADIATION ONCOLOGY | Facility: CLINIC | Age: 72
End: 2022-12-07
Attending: RADIOLOGY
Payer: COMMERCIAL

## 2022-12-07 PROCEDURE — 77385 HC IMRT TREATMENT DELIVERY, SIMPLE: CPT | Performed by: RADIOLOGY

## 2022-12-07 PROCEDURE — 77014 PR CT GUIDE FOR PLACEMENT RADIATION THERAPY FIELDS: CPT | Mod: 26 | Performed by: RADIOLOGY

## 2022-12-08 ENCOUNTER — APPOINTMENT (OUTPATIENT)
Dept: RADIATION ONCOLOGY | Facility: CLINIC | Age: 72
End: 2022-12-08
Attending: RADIOLOGY
Payer: COMMERCIAL

## 2022-12-08 PROCEDURE — 77014 PR CT GUIDE FOR PLACEMENT RADIATION THERAPY FIELDS: CPT | Mod: 26 | Performed by: STUDENT IN AN ORGANIZED HEALTH CARE EDUCATION/TRAINING PROGRAM

## 2022-12-08 PROCEDURE — 77385 HC IMRT TREATMENT DELIVERY, SIMPLE: CPT | Performed by: STUDENT IN AN ORGANIZED HEALTH CARE EDUCATION/TRAINING PROGRAM

## 2022-12-09 ENCOUNTER — APPOINTMENT (OUTPATIENT)
Dept: RADIATION ONCOLOGY | Facility: CLINIC | Age: 72
End: 2022-12-09
Attending: RADIOLOGY
Payer: COMMERCIAL

## 2022-12-09 PROCEDURE — 77385 HC IMRT TREATMENT DELIVERY, SIMPLE: CPT | Performed by: STUDENT IN AN ORGANIZED HEALTH CARE EDUCATION/TRAINING PROGRAM

## 2022-12-09 PROCEDURE — 77014 PR CT GUIDE FOR PLACEMENT RADIATION THERAPY FIELDS: CPT | Mod: 26 | Performed by: STUDENT IN AN ORGANIZED HEALTH CARE EDUCATION/TRAINING PROGRAM

## 2022-12-12 ENCOUNTER — APPOINTMENT (OUTPATIENT)
Dept: RADIATION ONCOLOGY | Facility: CLINIC | Age: 72
End: 2022-12-12
Attending: RADIOLOGY
Payer: COMMERCIAL

## 2022-12-12 PROCEDURE — 77014 PR CT GUIDE FOR PLACEMENT RADIATION THERAPY FIELDS: CPT | Mod: 26 | Performed by: RADIOLOGY

## 2022-12-12 PROCEDURE — 77385 HC IMRT TREATMENT DELIVERY, SIMPLE: CPT | Performed by: RADIOLOGY

## 2022-12-13 ENCOUNTER — APPOINTMENT (OUTPATIENT)
Dept: RADIATION ONCOLOGY | Facility: CLINIC | Age: 72
End: 2022-12-13
Attending: RADIOLOGY
Payer: COMMERCIAL

## 2022-12-13 VITALS
OXYGEN SATURATION: 98 % | WEIGHT: 187.2 LBS | BODY MASS INDEX: 28.05 KG/M2 | SYSTOLIC BLOOD PRESSURE: 156 MMHG | HEART RATE: 63 BPM | DIASTOLIC BLOOD PRESSURE: 82 MMHG | TEMPERATURE: 97.8 F | RESPIRATION RATE: 16 BRPM

## 2022-12-13 DIAGNOSIS — C61 PROSTATE CANCER (H): Primary | ICD-10-CM

## 2022-12-13 PROCEDURE — 77385 HC IMRT TREATMENT DELIVERY, SIMPLE: CPT | Performed by: STUDENT IN AN ORGANIZED HEALTH CARE EDUCATION/TRAINING PROGRAM

## 2022-12-13 PROCEDURE — 77014 PR CT GUIDE FOR PLACEMENT RADIATION THERAPY FIELDS: CPT | Mod: 26 | Performed by: STUDENT IN AN ORGANIZED HEALTH CARE EDUCATION/TRAINING PROGRAM

## 2022-12-13 PROCEDURE — 77336 RADIATION PHYSICS CONSULT: CPT | Performed by: RADIOLOGY

## 2022-12-13 PROCEDURE — 77427 RADIATION TX MANAGEMENT X5: CPT | Performed by: RADIOLOGY

## 2022-12-13 NOTE — LETTER
12/13/2022         RE: Roland Dejessu  [address] [facility] [address]        Dear Colleague,    Thank you for referring your patient, Roland Dejesus, to the [facility] RADIATION ONCOLOGY [place]. Please see a copy of my visit note below.      RADIATION ONCOLOGY WEEKLY TREATMENT VISIT NOTE      Assessment / Impression     Prostate cancer (H) [C61]     Cancer Staging   No matching staging information was found for the patient.     Tolerating radiation therapy well.  All questions and concerns addressed.    No significant acute side effects    Plan:     Continue radiation treatment as prescribed.  Radiation:   Site: Prostate  Stereotactic Radiosurgery: No  Concurrent Therapy: Yes (leuprolide, casodex)  Today's Dose: 2700  Total Dose for Pelvis: 7020  Today's Fraction/Total Fraction Pelvis: 10/26    Continue MiraLAX every other day for consistent bowels  Discussed bladder filling and anticipated LUTS from radiation    Subjective:      HPI: Roland Dejesus is a 72 year old male with  Prostate cancer (H) [C61]    Patient is tolerating radiation therapy well.  He has some hesitancy with urination but no significant increase in frequency urgency or nocturia.  He is using MiraLAX every other day for consistent bowels.    The following portions of the patient's history were reviewed and updated as appropriate: allergies, current medications, past family history, past medical history, past social history, past surgical history and problem list.    Assessment                  Body Site:  Pelvis  Stereotactic Radiosurgery: No  Concurrent Therapy: Yes (leuprolide, casodex)  Today's Dose: 2700  Total Dose for Pelvis: 7020  Today's Fraction/Total Fraction Pelvis: 10/26  Drainage: 0: Absent  Diarrhea W/O Colostomy: 0: None  Constipation: 1: Requiring stool softner or dietary modifation (miralax every other day)  Proctitis: 0: None  Urinary frequency and/or urgency: 0: Normal  Urinary Retention: 1: Hesitancy or  dribbling. No sig residual, retention during immediate post op  Urinary Incontinence: 0: None  Dysuria: 0: None  Nocturia: 1-2  Urine Color Change: 0: Normal                                     Sexuality Alteration                    Emotional Alteration    Copin: Effective  Comfort Alteration   KPS: 100 % Normal, no complaints  Fatigue (ONS scale): 0: No Fatigue  Pain Location: left hip/back  Pain Intensity. Rate degree of pain ranging from 0 (no pain) to 10 (severe pain): 0  Pain Description: Dull intermittent - Dull type of ache which is intermittent  Pain Intervention: 1: Over the counter medications (tylenol, chiropractor)  Effectiveness of pain intervention: 4: Pain relieved 100%   Nutrition Alteration   Anorexia: 0: None  Nausea: 0: None  Vomitin: None  Weight: 84.9 kg (187 lb 3.2 oz)  Skin Alteration   Skin Sensation: 0: No problem  Skin Reaction: 0: None  AUA Assessment                                           Accompanied by       Objective:     Exam:     Vitals:    22 1343   BP: (!) 156/82   Pulse: 63   Resp: 16   Temp: 97.8  F (36.6  C)   TempSrc: Oral   SpO2: 98%   Weight: 84.9 kg (187 lb 3.2 oz)       Wt Readings from Last 8 Encounters:   22 84.9 kg (187 lb 3.2 oz)   22 84.7 kg (186 lb 12.8 oz)   22 84.2 kg (185 lb 11.2 oz)   22 83.5 kg (184 lb)   22 84.8 kg (186 lb 14.4 oz)   22 83.6 kg (184 lb 4.8 oz)   21 80.3 kg (177 lb)   21 79.6 kg (175 lb 8 oz)       General: Alert and oriented, in no acute distress  Roland has no Erythema.    Treatment Summary to Date    Aria chart and setup information reviewed    Zaira Lui MD      Again, thank you for allowing me to participate in the care of your patient.        Sincerely,        Zaira Lui MD

## 2022-12-13 NOTE — PROGRESS NOTES
RADIATION ONCOLOGY WEEKLY TREATMENT VISIT NOTE      Assessment / Impression     Prostate cancer (H) [C61]     Cancer Staging   No matching staging information was found for the patient.     Tolerating radiation therapy well.  All questions and concerns addressed.    No significant acute side effects    Plan:     Continue radiation treatment as prescribed.  Radiation:   Site: Prostate  Stereotactic Radiosurgery: No  Concurrent Therapy: Yes (leuprolide, casodex)  Today's Dose: 2700  Total Dose for Pelvis: 7020  Today's Fraction/Total Fraction Pelvis: 10/26    Continue MiraLAX every other day for consistent bowels  Discussed bladder filling and anticipated LUTS from radiation    Subjective:      HPI: Roland Dejesus is a 72 year old male with  Prostate cancer (H) [C61]    Patient is tolerating radiation therapy well.  He has some hesitancy with urination but no significant increase in frequency urgency or nocturia.  He is using MiraLAX every other day for consistent bowels.    The following portions of the patient's history were reviewed and updated as appropriate: allergies, current medications, past family history, past medical history, past social history, past surgical history and problem list.    Assessment                  Body Site:  Pelvis  Stereotactic Radiosurgery: No  Concurrent Therapy: Yes (leuprolide, casodex)  Today's Dose: 2700  Total Dose for Pelvis: 7020  Today's Fraction/Total Fraction Pelvis: 10/26  Drainage: 0: Absent  Diarrhea W/O Colostomy: 0: None  Constipation: 1: Requiring stool softner or dietary modifation (miralax every other day)  Proctitis: 0: None  Urinary frequency and/or urgency: 0: Normal  Urinary Retention: 1: Hesitancy or dribbling. No sig residual, retention during immediate post op  Urinary Incontinence: 0: None  Dysuria: 0: None  Nocturia: 1-2  Urine Color Change: 0: Normal                                     Sexuality Alteration                    Emotional  Alteration    Copin: Effective  Comfort Alteration   KPS: 100 % Normal, no complaints  Fatigue (ONS scale): 0: No Fatigue  Pain Location: left hip/back  Pain Intensity. Rate degree of pain ranging from 0 (no pain) to 10 (severe pain): 0  Pain Description: Dull intermittent - Dull type of ache which is intermittent  Pain Intervention: 1: Over the counter medications (tylenol, chiropractor)  Effectiveness of pain intervention: 4: Pain relieved 100%   Nutrition Alteration   Anorexia: 0: None  Nausea: 0: None  Vomitin: None  Weight: 84.9 kg (187 lb 3.2 oz)  Skin Alteration   Skin Sensation: 0: No problem  Skin Reaction: 0: None  AUA Assessment                                           Accompanied by       Objective:     Exam:     Vitals:    22 1343   BP: (!) 156/82   Pulse: 63   Resp: 16   Temp: 97.8  F (36.6  C)   TempSrc: Oral   SpO2: 98%   Weight: 84.9 kg (187 lb 3.2 oz)       Wt Readings from Last 8 Encounters:   22 84.9 kg (187 lb 3.2 oz)   22 84.7 kg (186 lb 12.8 oz)   22 84.2 kg (185 lb 11.2 oz)   22 83.5 kg (184 lb)   22 84.8 kg (186 lb 14.4 oz)   22 83.6 kg (184 lb 4.8 oz)   21 80.3 kg (177 lb)   21 79.6 kg (175 lb 8 oz)       General: Alert and oriented, in no acute distress  Roland has no Erythema.    Treatment Summary to Date    Aria chart and setup information reviewed    Zaira Lui MD

## 2022-12-15 ENCOUNTER — APPOINTMENT (OUTPATIENT)
Dept: RADIATION ONCOLOGY | Facility: CLINIC | Age: 72
End: 2022-12-15
Attending: RADIOLOGY
Payer: COMMERCIAL

## 2022-12-15 PROCEDURE — 77014 PR CT GUIDE FOR PLACEMENT RADIATION THERAPY FIELDS: CPT | Mod: 26 | Performed by: RADIOLOGY

## 2022-12-15 PROCEDURE — 77385 HC IMRT TREATMENT DELIVERY, SIMPLE: CPT | Performed by: RADIOLOGY

## 2022-12-16 ENCOUNTER — APPOINTMENT (OUTPATIENT)
Dept: RADIATION ONCOLOGY | Facility: CLINIC | Age: 72
End: 2022-12-16
Attending: RADIOLOGY
Payer: COMMERCIAL

## 2022-12-16 PROCEDURE — 77385 HC IMRT TREATMENT DELIVERY, SIMPLE: CPT | Performed by: STUDENT IN AN ORGANIZED HEALTH CARE EDUCATION/TRAINING PROGRAM

## 2022-12-16 PROCEDURE — 77014 PR CT GUIDE FOR PLACEMENT RADIATION THERAPY FIELDS: CPT | Mod: 26 | Performed by: STUDENT IN AN ORGANIZED HEALTH CARE EDUCATION/TRAINING PROGRAM

## 2022-12-19 ENCOUNTER — APPOINTMENT (OUTPATIENT)
Dept: RADIATION ONCOLOGY | Facility: CLINIC | Age: 72
End: 2022-12-19
Attending: RADIOLOGY
Payer: COMMERCIAL

## 2022-12-19 PROCEDURE — 77385 HC IMRT TREATMENT DELIVERY, SIMPLE: CPT | Performed by: STUDENT IN AN ORGANIZED HEALTH CARE EDUCATION/TRAINING PROGRAM

## 2022-12-19 PROCEDURE — 77014 PR CT GUIDE FOR PLACEMENT RADIATION THERAPY FIELDS: CPT | Mod: 26 | Performed by: STUDENT IN AN ORGANIZED HEALTH CARE EDUCATION/TRAINING PROGRAM

## 2022-12-20 ENCOUNTER — OFFICE VISIT (OUTPATIENT)
Dept: RADIATION ONCOLOGY | Facility: CLINIC | Age: 72
End: 2022-12-20
Attending: RADIOLOGY
Payer: COMMERCIAL

## 2022-12-20 VITALS
HEART RATE: 61 BPM | OXYGEN SATURATION: 98 % | SYSTOLIC BLOOD PRESSURE: 158 MMHG | WEIGHT: 188.4 LBS | TEMPERATURE: 97.7 F | RESPIRATION RATE: 16 BRPM | DIASTOLIC BLOOD PRESSURE: 80 MMHG | BODY MASS INDEX: 28.23 KG/M2

## 2022-12-20 DIAGNOSIS — C61 PROSTATE CANCER (H): Primary | ICD-10-CM

## 2022-12-20 PROCEDURE — 77014 PR CT GUIDE FOR PLACEMENT RADIATION THERAPY FIELDS: CPT | Mod: 26 | Performed by: RADIOLOGY

## 2022-12-20 PROCEDURE — 77385 HC IMRT TREATMENT DELIVERY, SIMPLE: CPT | Performed by: RADIOLOGY

## 2022-12-20 NOTE — LETTER
2022         RE: Roland Dejesus  [address] [facility] [address]        Dear Colleague,    Thank you for referring your patient, Roland Dejesus, to the [facility] RADIATION ONCOLOGY [place]. Please see a copy of my visit note below.      RADIATION ONCOLOGY WEEKLY TREATMENT VISIT NOTE      Assessment / Impression     Prostate cancer (H) [C61]    Tolerating radiation therapy well.  All questions and concerns addressed.    Plan:     Continue radiation treatment as prescribed.    Subjective:      HPI: Roland Dejesus is a 72 year old male with  Prostate cancer (H) [C61]    The following portions of the patient's history were reviewed and updated as appropriate: allergies, current medications, past family history, past medical history, past social history, past surgical history and problem list.    Assessment                  Body Site:  Pelvis  Stereotactic Radiosurgery: No  Concurrent Therapy: Yes (leuprolide, casodex)  Today's Dose: 3780  Total Dose for Pelvis: 7020  Today's Fraction/Total Fraction Pelvis:   Drainage: 0: Absent  Diarrhea W/O Colostomy: 0: None  Constipation: 1: Requiring stool softner or dietary modifation (miralax every other day)  Proctitis: 0: None  Urinary frequency and/or urgency: 1: Increase in frequency or nocturia up to two times normal  Urinary Retention: 1: Hesitancy or dribbling. No sig residual, retention during immediate post op  Urinary Incontinence: 0: None  Dysuria: 0: None  Nocturia: 3-5  Urine Color Change: 0: Normal                                     Sexuality Alteration                    Emotional Alteration    Copin: Effective  Comfort Alteration   KPS: 100 % Normal, no complaints  Fatigue (ONS scale): 0: No Fatigue  Pain Location: left hip/back  Pain Intensity. Rate degree of pain ranging from 0 (no pain) to 10 (severe pain): 3  Pain Description: Dull intermittent - Dull type of ache which is intermittent  Pain Intervention: 1:  Over the counter medications (tylenol, chiropractor)  Effectiveness of pain intervention: 4: Pain relieved 100%   Nutrition Alteration   Anorexia: 0: None  Nausea: 0: None  Vomitin: None  Weight: 85.5 kg (188 lb 6.4 oz)  Skin Alteration   Skin Sensation: 0: No problem  Skin Reaction: 0: None  AUA Assessment                                           Accompanied by       Objective:     Exam: Examination reviewed no significant changes.    Vitals:    22 1348   BP: (!) 158/80   Pulse: 61   Resp: 16   Temp: 97.7  F (36.5  C)   TempSrc: Oral   SpO2: 98%   Weight: 85.5 kg (188 lb 6.4 oz)       Wt Readings from Last 8 Encounters:   22 85.5 kg (188 lb 6.4 oz)   22 84.9 kg (187 lb 3.2 oz)   22 84.7 kg (186 lb 12.8 oz)   22 84.2 kg (185 lb 11.2 oz)   22 83.5 kg (184 lb)   22 84.8 kg (186 lb 14.4 oz)   22 83.6 kg (184 lb 4.8 oz)   21 80.3 kg (177 lb)       General: Alert and oriented, in no acute distress  Roland has no Erythema.  Aria chart and setup information reviewed    Ciarra Pham MD        Again, thank you for allowing me to participate in the care of your patient.        Sincerely,        Ciarra Pham MD

## 2022-12-20 NOTE — PROGRESS NOTES
RADIATION ONCOLOGY WEEKLY TREATMENT VISIT NOTE      Assessment / Impression     Prostate cancer (H) [C61]    Tolerating radiation therapy well.  All questions and concerns addressed.    Plan:     Continue radiation treatment as prescribed.    Subjective:      HPI: oRland Dejesus is a 72 year old male with  Prostate cancer (H) [C61]    The following portions of the patient's history were reviewed and updated as appropriate: allergies, current medications, past family history, past medical history, past social history, past surgical history and problem list.    Assessment                  Body Site:  Pelvis  Stereotactic Radiosurgery: No  Concurrent Therapy: Yes (leuprolide, casodex)  Today's Dose: 3780  Total Dose for Pelvis: 7020  Today's Fraction/Total Fraction Pelvis:   Drainage: 0: Absent  Diarrhea W/O Colostomy: 0: None  Constipation: 1: Requiring stool softner or dietary modifation (miralax every other day)  Proctitis: 0: None  Urinary frequency and/or urgency: 1: Increase in frequency or nocturia up to two times normal  Urinary Retention: 1: Hesitancy or dribbling. No sig residual, retention during immediate post op  Urinary Incontinence: 0: None  Dysuria: 0: None  Nocturia: 3-5  Urine Color Change: 0: Normal                                     Sexuality Alteration                    Emotional Alteration    Copin: Effective  Comfort Alteration   KPS: 100 % Normal, no complaints  Fatigue (ONS scale): 0: No Fatigue  Pain Location: left hip/back  Pain Intensity. Rate degree of pain ranging from 0 (no pain) to 10 (severe pain): 3  Pain Description: Dull intermittent - Dull type of ache which is intermittent  Pain Intervention: 1: Over the counter medications (tylenol, chiropractor)  Effectiveness of pain intervention: 4: Pain relieved 100%   Nutrition Alteration   Anorexia: 0: None  Nausea: 0: None  Vomitin: None  Weight: 85.5 kg (188 lb 6.4 oz)  Skin Alteration   Skin Sensation: 0: No  problem  Skin Reaction: 0: None  AUA Assessment                                           Accompanied by       Objective:     Exam: Examination reviewed no significant changes.    Vitals:    12/20/22 1348   BP: (!) 158/80   Pulse: 61   Resp: 16   Temp: 97.7  F (36.5  C)   TempSrc: Oral   SpO2: 98%   Weight: 85.5 kg (188 lb 6.4 oz)       Wt Readings from Last 8 Encounters:   12/20/22 85.5 kg (188 lb 6.4 oz)   12/13/22 84.9 kg (187 lb 3.2 oz)   12/06/22 84.7 kg (186 lb 12.8 oz)   09/28/22 84.2 kg (185 lb 11.2 oz)   09/23/22 83.5 kg (184 lb)   09/16/22 84.8 kg (186 lb 14.4 oz)   08/23/22 83.6 kg (184 lb 4.8 oz)   09/07/21 80.3 kg (177 lb)       General: Alert and oriented, in no acute distress  Roland has no Erythema.  Aria chart and setup information reviewed    Ciarra Pham MD

## 2022-12-21 ENCOUNTER — APPOINTMENT (OUTPATIENT)
Dept: RADIATION ONCOLOGY | Facility: CLINIC | Age: 72
End: 2022-12-21
Attending: RADIOLOGY
Payer: COMMERCIAL

## 2022-12-21 PROCEDURE — 77336 RADIATION PHYSICS CONSULT: CPT | Performed by: RADIOLOGY

## 2022-12-21 PROCEDURE — 77385 HC IMRT TREATMENT DELIVERY, SIMPLE: CPT | Performed by: RADIOLOGY

## 2022-12-21 PROCEDURE — 77427 RADIATION TX MANAGEMENT X5: CPT | Performed by: RADIOLOGY

## 2022-12-21 PROCEDURE — 77014 PR CT GUIDE FOR PLACEMENT RADIATION THERAPY FIELDS: CPT | Mod: 26 | Performed by: RADIOLOGY

## 2022-12-22 ENCOUNTER — APPOINTMENT (OUTPATIENT)
Dept: RADIATION ONCOLOGY | Facility: CLINIC | Age: 72
End: 2022-12-22
Attending: RADIOLOGY
Payer: COMMERCIAL

## 2022-12-22 PROCEDURE — 77014 PR CT GUIDE FOR PLACEMENT RADIATION THERAPY FIELDS: CPT | Mod: 26 | Performed by: STUDENT IN AN ORGANIZED HEALTH CARE EDUCATION/TRAINING PROGRAM

## 2022-12-22 PROCEDURE — 77385 HC IMRT TREATMENT DELIVERY, SIMPLE: CPT | Performed by: STUDENT IN AN ORGANIZED HEALTH CARE EDUCATION/TRAINING PROGRAM

## 2022-12-23 ENCOUNTER — APPOINTMENT (OUTPATIENT)
Dept: RADIATION ONCOLOGY | Facility: CLINIC | Age: 72
End: 2022-12-23
Attending: RADIOLOGY
Payer: COMMERCIAL

## 2022-12-23 PROCEDURE — 77385 HC IMRT TREATMENT DELIVERY, SIMPLE: CPT | Performed by: STUDENT IN AN ORGANIZED HEALTH CARE EDUCATION/TRAINING PROGRAM

## 2022-12-23 PROCEDURE — 77014 PR CT GUIDE FOR PLACEMENT RADIATION THERAPY FIELDS: CPT | Mod: 26 | Performed by: STUDENT IN AN ORGANIZED HEALTH CARE EDUCATION/TRAINING PROGRAM

## 2022-12-26 ENCOUNTER — APPOINTMENT (OUTPATIENT)
Dept: RADIATION ONCOLOGY | Facility: CLINIC | Age: 72
End: 2022-12-26
Attending: RADIOLOGY
Payer: COMMERCIAL

## 2022-12-26 PROCEDURE — 77014 PR CT GUIDE FOR PLACEMENT RADIATION THERAPY FIELDS: CPT | Mod: 26 | Performed by: RADIOLOGY

## 2022-12-26 PROCEDURE — 77385 HC IMRT TREATMENT DELIVERY, SIMPLE: CPT | Performed by: RADIOLOGY

## 2022-12-27 ENCOUNTER — OFFICE VISIT (OUTPATIENT)
Dept: RADIATION ONCOLOGY | Facility: CLINIC | Age: 72
End: 2022-12-27
Attending: RADIOLOGY
Payer: COMMERCIAL

## 2022-12-27 VITALS
RESPIRATION RATE: 16 BRPM | SYSTOLIC BLOOD PRESSURE: 162 MMHG | HEART RATE: 66 BPM | DIASTOLIC BLOOD PRESSURE: 79 MMHG | WEIGHT: 188.6 LBS | BODY MASS INDEX: 28.26 KG/M2 | OXYGEN SATURATION: 99 %

## 2022-12-27 DIAGNOSIS — C61 PROSTATE CANCER (H): Primary | ICD-10-CM

## 2022-12-27 PROCEDURE — 77385 HC IMRT TREATMENT DELIVERY, SIMPLE: CPT | Performed by: RADIOLOGY

## 2022-12-27 PROCEDURE — 77014 PR CT GUIDE FOR PLACEMENT RADIATION THERAPY FIELDS: CPT | Mod: 26 | Performed by: RADIOLOGY

## 2022-12-27 ASSESSMENT — PAIN SCALES - GENERAL: PAINLEVEL: NO PAIN (0)

## 2022-12-27 NOTE — PROGRESS NOTES
RADIATION ONCOLOGY WEEKLY TREATMENT VISIT NOTE      Assessment / Impression     Prostate cancer (H) [C61]    Tolerating radiation therapy well.  All questions and concerns addressed.    Plan:     Continue radiation treatment as prescribed.    Subjective:      HPI: Roland Dejesus is a 72 year old male with  Prostate cancer (H) [C61]    The following portions of the patient's history were reviewed and updated as appropriate: allergies, current medications, past family history, past medical history, past social history, past surgical history and problem list.    Assessment                  Body Site:  Pelvis  Stereotactic Radiosurgery: No  Concurrent Therapy: Yes (leuprolide, casodex)  Today's Dose: 5130  Total Dose for Pelvis: 7020  Today's Fraction/Total Fraction Pelvis:   Drainage: 0: Absent  Diarrhea W/O Colostomy: 0: None  Constipation: 1: Requiring stool softner or dietary modifation (miralax daily)  Proctitis: 0: None  Urinary frequency and/or urgency: 1: Increase in frequency or nocturia up to two times normal  Urinary Retention: 1: Hesitancy or dribbling. No sig residual, retention during immediate post op  Urinary Incontinence: 1: Stress incontinence  Dysuria: 1: Mild symptoms requiring no intervention  Nocturia: 2  Urine Color Change: 0: Normal                                     Sexuality Alteration                    Emotional Alteration    Copin: Effective  Comfort Alteration   KPS: 100 % Normal, no complaints  Fatigue (ONS scale): 2: Mild Fatigue  Pain Location: denies   Nutrition Alteration   Anorexia: 0: None  Nausea: 0: None  Vomitin: None  Weight: 85.5 kg (188 lb 9.6 oz)  Skin Alteration   Skin Sensation: 0: No problem  Skin Reaction: 0: None  AUA Assessment                                           Accompanied by       Objective:     Exam: Examination reviewed no significant changes.    Vitals:    22 1402   BP: (!) 162/79   Pulse: 66   Resp: 16   SpO2: 99%   Weight:  85.5 kg (188 lb 9.6 oz)   PainSc: No Pain (0)       Wt Readings from Last 8 Encounters:   12/27/22 85.5 kg (188 lb 9.6 oz)   12/20/22 85.5 kg (188 lb 6.4 oz)   12/13/22 84.9 kg (187 lb 3.2 oz)   12/06/22 84.7 kg (186 lb 12.8 oz)   09/28/22 84.2 kg (185 lb 11.2 oz)   09/23/22 83.5 kg (184 lb)   09/16/22 84.8 kg (186 lb 14.4 oz)   08/23/22 83.6 kg (184 lb 4.8 oz)       General: Alert and oriented, in no acute distress  Roland has no Erythema.  Aria chart and setup information reviewed    Ciarra Pham MD

## 2022-12-27 NOTE — LETTER
2022         RE: Roland Dejesus  [address] [facility] [address]        Dear Colleague,    Thank you for referring your patient, Roland Dejesus, to the [facility] RADIATION ONCOLOGY [place]. Please see a copy of my visit note below.      RADIATION ONCOLOGY WEEKLY TREATMENT VISIT NOTE      Assessment / Impression     Prostate cancer (H) [C61]    Tolerating radiation therapy well.  All questions and concerns addressed.    Plan:     Continue radiation treatment as prescribed.    Subjective:      HPI: Roland Dejesus is a 72 year old male with  Prostate cancer (H) [C61]    The following portions of the patient's history were reviewed and updated as appropriate: allergies, current medications, past family history, past medical history, past social history, past surgical history and problem list.    Assessment                  Body Site:  Pelvis  Stereotactic Radiosurgery: No  Concurrent Therapy: Yes (leuprolide, casodex)  Today's Dose: 5130  Total Dose for Pelvis: 7020  Today's Fraction/Total Fraction Pelvis:   Drainage: 0: Absent  Diarrhea W/O Colostomy: 0: None  Constipation: 1: Requiring stool softner or dietary modifation (miralax daily)  Proctitis: 0: None  Urinary frequency and/or urgency: 1: Increase in frequency or nocturia up to two times normal  Urinary Retention: 1: Hesitancy or dribbling. No sig residual, retention during immediate post op  Urinary Incontinence: 1: Stress incontinence  Dysuria: 1: Mild symptoms requiring no intervention  Nocturia: 2  Urine Color Change: 0: Normal                                     Sexuality Alteration                    Emotional Alteration    Copin: Effective  Comfort Alteration   KPS: 100 % Normal, no complaints  Fatigue (ONS scale): 2: Mild Fatigue  Pain Location: denies   Nutrition Alteration   Anorexia: 0: None  Nausea: 0: None  Vomitin: None  Weight: 85.5 kg (188 lb 9.6 oz)  Skin Alteration   Skin Sensation: 0: No  problem  Skin Reaction: 0: None  AUA Assessment                                           Accompanied by       Objective:     Exam: Examination reviewed no significant changes.    Vitals:    12/27/22 1402   BP: (!) 162/79   Pulse: 66   Resp: 16   SpO2: 99%   Weight: 85.5 kg (188 lb 9.6 oz)   PainSc: No Pain (0)       Wt Readings from Last 8 Encounters:   12/27/22 85.5 kg (188 lb 9.6 oz)   12/20/22 85.5 kg (188 lb 6.4 oz)   12/13/22 84.9 kg (187 lb 3.2 oz)   12/06/22 84.7 kg (186 lb 12.8 oz)   09/28/22 84.2 kg (185 lb 11.2 oz)   09/23/22 83.5 kg (184 lb)   09/16/22 84.8 kg (186 lb 14.4 oz)   08/23/22 83.6 kg (184 lb 4.8 oz)       General: Alert and oriented, in no acute distress  Roland has no Erythema.  Aria chart and setup information reviewed    Ciarra Pham MD        Again, thank you for allowing me to participate in the care of your patient.        Sincerely,        Ciarra Pham MD

## 2022-12-28 ENCOUNTER — APPOINTMENT (OUTPATIENT)
Dept: RADIATION ONCOLOGY | Facility: CLINIC | Age: 72
End: 2022-12-28
Attending: RADIOLOGY
Payer: COMMERCIAL

## 2022-12-28 PROCEDURE — 77014 PR CT GUIDE FOR PLACEMENT RADIATION THERAPY FIELDS: CPT | Mod: 26 | Performed by: RADIOLOGY

## 2022-12-28 PROCEDURE — 77336 RADIATION PHYSICS CONSULT: CPT | Performed by: RADIOLOGY

## 2022-12-28 PROCEDURE — 77427 RADIATION TX MANAGEMENT X5: CPT | Performed by: RADIOLOGY

## 2022-12-28 PROCEDURE — 77385 HC IMRT TREATMENT DELIVERY, SIMPLE: CPT | Performed by: RADIOLOGY

## 2022-12-29 ENCOUNTER — APPOINTMENT (OUTPATIENT)
Dept: RADIATION ONCOLOGY | Facility: CLINIC | Age: 72
End: 2022-12-29
Attending: RADIOLOGY
Payer: COMMERCIAL

## 2022-12-29 PROCEDURE — 77014 PR CT GUIDE FOR PLACEMENT RADIATION THERAPY FIELDS: CPT | Mod: 26 | Performed by: RADIOLOGY

## 2022-12-29 PROCEDURE — 77385 HC IMRT TREATMENT DELIVERY, SIMPLE: CPT | Performed by: RADIOLOGY

## 2022-12-30 ENCOUNTER — APPOINTMENT (OUTPATIENT)
Dept: RADIATION ONCOLOGY | Facility: CLINIC | Age: 72
End: 2022-12-30
Attending: RADIOLOGY
Payer: COMMERCIAL

## 2022-12-30 PROCEDURE — 77014 PR CT GUIDE FOR PLACEMENT RADIATION THERAPY FIELDS: CPT | Mod: 26 | Performed by: RADIOLOGY

## 2022-12-30 PROCEDURE — 77385 HC IMRT TREATMENT DELIVERY, SIMPLE: CPT | Performed by: RADIOLOGY

## 2023-01-03 ENCOUNTER — APPOINTMENT (OUTPATIENT)
Dept: RADIATION ONCOLOGY | Facility: CLINIC | Age: 73
End: 2023-01-03
Attending: RADIOLOGY
Payer: COMMERCIAL

## 2023-01-03 PROCEDURE — 77014 PR CT GUIDE FOR PLACEMENT RADIATION THERAPY FIELDS: CPT | Mod: 26 | Performed by: RADIOLOGY

## 2023-01-03 PROCEDURE — 77385 HC IMRT TREATMENT DELIVERY, SIMPLE: CPT | Performed by: RADIOLOGY

## 2023-01-04 ENCOUNTER — APPOINTMENT (OUTPATIENT)
Dept: RADIATION ONCOLOGY | Facility: CLINIC | Age: 73
End: 2023-01-04
Attending: RADIOLOGY
Payer: COMMERCIAL

## 2023-01-04 VITALS
SYSTOLIC BLOOD PRESSURE: 151 MMHG | HEART RATE: 60 BPM | TEMPERATURE: 97.7 F | WEIGHT: 189.7 LBS | OXYGEN SATURATION: 100 % | RESPIRATION RATE: 16 BRPM | BODY MASS INDEX: 28.42 KG/M2 | DIASTOLIC BLOOD PRESSURE: 76 MMHG

## 2023-01-04 DIAGNOSIS — C61 PROSTATE CANCER (H): Primary | ICD-10-CM

## 2023-01-04 PROCEDURE — 77014 PR CT GUIDE FOR PLACEMENT RADIATION THERAPY FIELDS: CPT | Mod: 26 | Performed by: RADIOLOGY

## 2023-01-04 PROCEDURE — 77385 HC IMRT TREATMENT DELIVERY, SIMPLE: CPT | Performed by: RADIOLOGY

## 2023-01-04 NOTE — LETTER
2023         RE: Roland Dejesus  [address] [facility] [address]        Dear Colleague,    Thank you for referring your patient, Roland Dejesus, to the [facility] RADIATION ONCOLOGY [place]. Please see a copy of my visit note below.      RADIATION ONCOLOGY WEEKLY TREATMENT VISIT NOTE      Assessment / Impression     Prostate cancer (H) [C61]    Tolerating radiation therapy well.  All questions and concerns addressed.    Plan:     Continue radiation treatment as prescribed.    Subjective:      HPI: Roland Dejesus is a 72 year old male with  Prostate cancer (H) [C61]    The following portions of the patient's history were reviewed and updated as appropriate: allergies, current medications, past family history, past medical history, past social history, past surgical history and problem list.    Assessment                  Body Site:  Pelvis  Stereotactic Radiosurgery: No  Concurrent Therapy: Yes (leuprolide, casodex)  Today's Dose: 6480  Total Dose for Pelvis: 7020  Today's Fraction/Total Fraction Pelvis:   Drainage: 0: Absent  Diarrhea W/O Colostomy: 0: None  Constipation: 1: Requiring stool softner or dietary modifation (miralax daily)  Proctitis: 0: None  Urinary frequency and/or urgency: 1: Increase in frequency or nocturia up to two times normal  Urinary Retention: 1: Hesitancy or dribbling. No sig residual, retention during immediate post op  Urinary Incontinence: 1: Stress incontinence  Dysuria: 1: Mild symptoms requiring no intervention (couple times in a week)  Nocturia: 2-3  Urine Color Change: 0: Normal                                     Sexuality Alteration                    Emotional Alteration    Copin: Effective  Comfort Alteration   KPS: 100 % Normal, no complaints  Fatigue (ONS scale): 2: Mild Fatigue  Pain Location: denies   Nutrition Alteration   Anorexia: 0: None  Nausea: 0: None  Vomitin: None  Weight: 86 kg (189 lb 11.2 oz)  Skin Alteration    Skin Sensation: 0: No problem  Skin Reaction: 0: None  AUA Assessment                                           Accompanied by       Objective:     Exam: Examination reviewed no significant changes.    Vitals:    01/04/23 1312   BP: (!) 151/76   Pulse: 60   Resp: 16   Temp: 97.7  F (36.5  C)   TempSrc: Oral   SpO2: 100%   Weight: 86 kg (189 lb 11.2 oz)       Wt Readings from Last 8 Encounters:   01/04/23 86 kg (189 lb 11.2 oz)   12/27/22 85.5 kg (188 lb 9.6 oz)   12/20/22 85.5 kg (188 lb 6.4 oz)   12/13/22 84.9 kg (187 lb 3.2 oz)   12/06/22 84.7 kg (186 lb 12.8 oz)   09/28/22 84.2 kg (185 lb 11.2 oz)   09/23/22 83.5 kg (184 lb)   09/16/22 84.8 kg (186 lb 14.4 oz)       General: Alert and oriented, in no acute distress  Roland has no Erythema.  Aria chart and setup information reviewed    Ciarra Pham MD        Again, thank you for allowing me to participate in the care of your patient.        Sincerely,        Ciarra Pham MD

## 2023-01-04 NOTE — PROGRESS NOTES
RADIATION ONCOLOGY WEEKLY TREATMENT VISIT NOTE      Assessment / Impression     Prostate cancer (H) [C61]    Tolerating radiation therapy well.  All questions and concerns addressed.    Plan:     Continue radiation treatment as prescribed.    Subjective:      HPI: Roland Dejesus is a 72 year old male with  Prostate cancer (H) [C61]    The following portions of the patient's history were reviewed and updated as appropriate: allergies, current medications, past family history, past medical history, past social history, past surgical history and problem list.    Assessment                  Body Site:  Pelvis  Stereotactic Radiosurgery: No  Concurrent Therapy: Yes (leuprolide, casodex)  Today's Dose: 6480  Total Dose for Pelvis: 7020  Today's Fraction/Total Fraction Pelvis:   Drainage: 0: Absent  Diarrhea W/O Colostomy: 0: None  Constipation: 1: Requiring stool softner or dietary modifation (miralax daily)  Proctitis: 0: None  Urinary frequency and/or urgency: 1: Increase in frequency or nocturia up to two times normal  Urinary Retention: 1: Hesitancy or dribbling. No sig residual, retention during immediate post op  Urinary Incontinence: 1: Stress incontinence  Dysuria: 1: Mild symptoms requiring no intervention (couple times in a week)  Nocturia: 2-3  Urine Color Change: 0: Normal                                     Sexuality Alteration                    Emotional Alteration    Copin: Effective  Comfort Alteration   KPS: 100 % Normal, no complaints  Fatigue (ONS scale): 2: Mild Fatigue  Pain Location: denies   Nutrition Alteration   Anorexia: 0: None  Nausea: 0: None  Vomitin: None  Weight: 86 kg (189 lb 11.2 oz)  Skin Alteration   Skin Sensation: 0: No problem  Skin Reaction: 0: None  AUA Assessment                                           Accompanied by       Objective:     Exam: Examination reviewed no significant changes.    Vitals:    23 1312   BP: (!) 151/76   Pulse: 60   Resp:  16   Temp: 97.7  F (36.5  C)   TempSrc: Oral   SpO2: 100%   Weight: 86 kg (189 lb 11.2 oz)       Wt Readings from Last 8 Encounters:   01/04/23 86 kg (189 lb 11.2 oz)   12/27/22 85.5 kg (188 lb 9.6 oz)   12/20/22 85.5 kg (188 lb 6.4 oz)   12/13/22 84.9 kg (187 lb 3.2 oz)   12/06/22 84.7 kg (186 lb 12.8 oz)   09/28/22 84.2 kg (185 lb 11.2 oz)   09/23/22 83.5 kg (184 lb)   09/16/22 84.8 kg (186 lb 14.4 oz)       General: Alert and oriented, in no acute distress  Roland has no Erythema.  Aria chart and setup information reviewed    Ciarra Pham MD

## 2023-01-05 ENCOUNTER — APPOINTMENT (OUTPATIENT)
Dept: RADIATION ONCOLOGY | Facility: CLINIC | Age: 73
End: 2023-01-05
Attending: RADIOLOGY
Payer: COMMERCIAL

## 2023-01-05 PROCEDURE — 77014 PR CT GUIDE FOR PLACEMENT RADIATION THERAPY FIELDS: CPT | Mod: 26 | Performed by: STUDENT IN AN ORGANIZED HEALTH CARE EDUCATION/TRAINING PROGRAM

## 2023-01-05 PROCEDURE — 77427 RADIATION TX MANAGEMENT X5: CPT | Performed by: RADIOLOGY

## 2023-01-05 PROCEDURE — 77336 RADIATION PHYSICS CONSULT: CPT | Performed by: RADIOLOGY

## 2023-01-05 PROCEDURE — 77385 HC IMRT TREATMENT DELIVERY, SIMPLE: CPT | Performed by: STUDENT IN AN ORGANIZED HEALTH CARE EDUCATION/TRAINING PROGRAM

## 2023-01-06 ENCOUNTER — ALLIED HEALTH/NURSE VISIT (OUTPATIENT)
Dept: RADIATION ONCOLOGY | Facility: CLINIC | Age: 73
End: 2023-01-06
Attending: RADIOLOGY
Payer: COMMERCIAL

## 2023-01-06 DIAGNOSIS — C61 PROSTATE CANCER (H): Primary | ICD-10-CM

## 2023-01-06 PROCEDURE — 77385 HC IMRT TREATMENT DELIVERY, SIMPLE: CPT | Performed by: STUDENT IN AN ORGANIZED HEALTH CARE EDUCATION/TRAINING PROGRAM

## 2023-01-06 PROCEDURE — 77014 PR CT GUIDE FOR PLACEMENT RADIATION THERAPY FIELDS: CPT | Mod: 26 | Performed by: STUDENT IN AN ORGANIZED HEALTH CARE EDUCATION/TRAINING PROGRAM

## 2023-01-06 NOTE — PROGRESS NOTES
Patient here ambulatory for final radiation therapy treatment for his prostate cancer.  Written discharge instructions reviewed and given to patient.  Follow up with Dr. Pham in about 4 to 6 weeks with a PSA.

## 2023-01-06 NOTE — PROGRESS NOTES
Radiation Treatment Summary          Patient: Roland Dejesus            MRN: [id number]           : 1950        Care Provider: Ciarra Pham MD         Date of Service: 2023        Pato Toure MD  [facility]OLOG[facility]  [address] [place] [address] [address]  [place] [address]              Dear Dr. Toure:     Your patient Mr. Roland Dejesus completed his radiation therapy on 2023. As you know Mr. Dejesus is a 72 year old male with a diagnosis of unfavorable intermediate risk prostate cancer, clinical stage T2 a N0 M0, Sirena grade 4+3 = 7 and 3+3 = 6 involving left side of prostate gland and pretherapy PSA of 4.11.  Staging work-up including CT abdomen pelvics and bone scan showed no evidence of metastatic disease.  The patient elected to proceed with 6 months hormone therapy (given on 2022) and definitive external beam radiation therapy for his prostate cancer and had radiation therapy in our clinic with a total dose of 7020 cGy in 26 treatment given from 2022 - 2022.  The patient tolerated ration therapy reasonably well with a minimal acute side effect.  He is scheduled to return to radiation oncology in 4-6 weeks for routine post therapy office follow-up and PSA.    Again, thank you very much for the referral and allowing me to participate in the care of this patient.  If you have any questions or concerns about this patient, please do not hesitate to call.          Sincerely,        Ciarra Pham MD, PhD  Department of Radiation Oncology   [facility] Radiation Oncology  Tel: [phone]  Page: [phone]    [facility]  [address] [place] [address]     [address]   [place] [address]      CC:  Patient Care Team:  True Mahajan MD as PCP - General (Internal Medicine)  True Mahajan MD as Assigned PCP  Pato Toure MD as MD (Urology)  Ciarra Pham MD as MD (Radiation Oncology)  Ciarra Pham MD as Assigned Cancer Care Provider

## 2023-01-16 ENCOUNTER — TELEPHONE (OUTPATIENT)
Dept: RADIATION ONCOLOGY | Facility: CLINIC | Age: 73
End: 2023-01-16

## 2023-01-16 NOTE — TELEPHONE ENCOUNTER
Called patient for routine follow up call s/p radiation for his prostate cancer.  Left message with follow up appointment information and call back number.  Told patient he did not need to call unless he had questions or concerns.

## 2023-03-06 ENCOUNTER — LAB (OUTPATIENT)
Dept: INFUSION THERAPY | Facility: CLINIC | Age: 73
End: 2023-03-06
Attending: RADIOLOGY
Payer: COMMERCIAL

## 2023-03-06 DIAGNOSIS — C61 PROSTATE CANCER (H): ICD-10-CM

## 2023-03-06 LAB — PSA SERPL-MCNC: 0.04 NG/ML (ref 0–6.5)

## 2023-03-06 PROCEDURE — 84153 ASSAY OF PSA TOTAL: CPT

## 2023-03-06 PROCEDURE — 36415 COLL VENOUS BLD VENIPUNCTURE: CPT

## 2023-03-08 ENCOUNTER — OFFICE VISIT (OUTPATIENT)
Dept: RADIATION ONCOLOGY | Facility: CLINIC | Age: 73
End: 2023-03-08
Attending: RADIOLOGY
Payer: COMMERCIAL

## 2023-03-08 VITALS
BODY MASS INDEX: 28.86 KG/M2 | HEART RATE: 76 BPM | DIASTOLIC BLOOD PRESSURE: 94 MMHG | WEIGHT: 192.6 LBS | SYSTOLIC BLOOD PRESSURE: 152 MMHG | RESPIRATION RATE: 16 BRPM | OXYGEN SATURATION: 100 % | TEMPERATURE: 97.6 F

## 2023-03-08 DIAGNOSIS — C61 PROSTATE CANCER (H): Primary | ICD-10-CM

## 2023-03-08 PROCEDURE — G0463 HOSPITAL OUTPT CLINIC VISIT: HCPCS | Performed by: RADIOLOGY

## 2023-03-08 NOTE — PROGRESS NOTES
"Oncology Rooming Note    March 8, 2023 9:56 AM   Roland Dejesus is a 73 year old male who presents for:    Chief Complaint   Patient presents with     Oncology Clinic Visit     Follow up with Dr. Pham     Initial Vitals: BP (!) 152/94 (BP Location: Right arm, Patient Position: Sitting, Cuff Size: Adult Regular)   Pulse 76   Temp 97.6  F (36.4  C) (Oral)   Resp 16   Wt 87.4 kg (192 lb 9.6 oz)   SpO2 100%   BMI 28.86 kg/m   Estimated body mass index is 28.86 kg/m  as calculated from the following:    Height as of 9/23/22: 1.74 m (5' 8.5\").    Weight as of this encounter: 87.4 kg (192 lb 9.6 oz). Body surface area is 2.06 meters squared.  No Pain (0) Comment: Data Unavailable   No LMP for male patient.  Allergies reviewed: Yes  Medications reviewed: Yes    Medications: Medication refills not needed today.  Pharmacy name entered into [employer]: [id number] [address] [facility]    Clinical concerns: Follow up s/p radiation for his prostate cancer, PSA and AUA completed, feels his bowel symptoms have improved Dr. Pham was notified.      Radha Pagan RN            "

## 2023-03-08 NOTE — LETTER
"    3/8/2023         RE: Roland Dejesus  [address] [facility] [address]        Dear Colleague,    Thank you for referring your patient, Roland Dejesus, to the [facility] RADIATION ONCOLOGY [place]. Please see a copy of my visit note below.    Oncology Rooming Note    March 8, 2023 9:56 AM   Roland Dejesus is a 73 year old male who presents for:    Chief Complaint   Patient presents with     Oncology Clinic Visit     Follow up with Dr. Pham     Initial Vitals: BP (!) 152/94 (BP Location: Right arm, Patient Position: Sitting, Cuff Size: Adult Regular)   Pulse 76   Temp 97.6  F (36.4  C) (Oral)   Resp 16   Wt 87.4 kg (192 lb 9.6 oz)   SpO2 100%   BMI 28.86 kg/m   Estimated body mass index is 28.86 kg/m  as calculated from the following:    Height as of 9/23/22: 1.74 m (5' 8.5\").    Weight as of this encounter: 87.4 kg (192 lb 9.6 oz). Body surface area is 2.06 meters squared.  No Pain (0) Comment: Data Unavailable   No LMP for male patient.  Allergies reviewed: Yes  Medications reviewed: Yes    Medications: Medication refills not needed today.  Pharmacy name entered into [facility]: [id number] [place], [place] - [address] [facility]    Clinical concerns: Follow up s/p radiation for his prostate cancer, PSA and AUA completed, feels his bowel symptoms have improved Dr. Pham was notified.      Radha Pagan RN              [facility] Radiation Oncology Follow Up     Patient: Roland Dejesus  MRN: [id number]  Date of Service: 03/08/2023       DISEASE TREATED:  Unfavorable intermediate risk prostate cancer, clinical stage T2 a N0 M0, Sirena grade 4+3 = 7 and 3+3 = 6 involving left side of prostate gland and pretherapy PSA of 7.3.  Staging work-up including MRI, CT abdomen pelvics and bone scan showed no evidence of metastatic disease.      TYPE OF RADIATION THERAPY ADMINISTERED:  7020 cGy in 26 treatment given from 11/30/2022 - 1/6/2022.       INTERVAL SINCE COMPLETION OF RADIATION " THERAPY: 2 months.      SUBJECTIVE:  Mr. Dejesus is a 73 year old male who  is a retired  and has been in his usual state of good health until recently.  The patient presented with recent history of elevation of PSA to around 4-5 level with rectal examination showed palpable prostate nodule for which he was seeking further evaluation.  His last PSA was measured 4.11 on 4/25/2022.  Patient underwent transrectal ultrasound study and biopsy on 7/19/2022.  The pathology showed adenocarcinoma, Sirena score 4+3 = 7 involving 70% biopsy tissue in the left lateral apex and [place] score 3+3 = 6 involving up to 15% biopsy tissue on the left side.  There is also evidence of perineural invasion.  Staging work-up including bone scan and CT abdomen pelvics showed no evidence of lymph nodes and bone metastasis.  You have discussed with the patient about various treatment options for the prostate cancer.  The patient was initially seen and evaluated on 8/23/2022.  Patient then had staging MRI prostate on 9/12/2022.  This showed PI-RADS 5 lesion in the left posterior lateral peripheral zone with no evidence of lymph nodes and bone metastasis. The patient elected to proceed with 6 months hormone therapy (given on 9/19/2022) and definitive external beam radiation therapy for his prostate cancer and had radiation therapy in our clinic with a total dose of 7020 cGy in 26 treatment given from 11/30/2022 - 1/6/2022. The patient tolerated ration therapy reasonably well with a minimal acute side effect.     The patient has been recovering well since completion of the radiation therapy.  He is bowel and bladder functions has been returned to his normal status.  The patient is here for routine post therapy office follow-up.    Medications were reviewed and are up to date on EPIC.    The following portions of the patient's history were reviewed and updated as appropriate: allergies, current medications, past family history, past  medical history, past social history, past surgical history and problem list.    Review of Systems:      General  Constitutional  Constitutional (WDL): All constitutional elements are within defined limits  EENT  Eye Disorders  Eye Disorder (WDL): Exceptions to WDL (wears glasses)  Dry Eye: Asymptomatic OR clinical or diagnostic observations only OR symptoms relieved by lubricants (occasionally)  Ear Disorders  Ear Disorder (WDL): All ear disorder elements are within defined limits  Respiratory  Respiratory  Respiratory (WDL): All respiratory elements are within defined limits  Cardiovascular  Cardiovascular  Cardiovascular (WDL): All cardiovascular elements are within defined limits (no pacemaker)  Gastrointestinal  Gastrointestinal  Gastrointestinal (WDL): All gastrointestinal elements are within defined limits  Musculoskeletal  Musculoskeletal and Connective Tissue Disorders  Musculoskeletal & Connective (WDL): All musculoskeletal & connective elements are within defined limits  Integumentary  Integumentary  Integumentary (WDL): All integumentary elements are within defined limits  Neurological  Neurosensory  Neurosensory (WDL): Exceptions to WDL  Peripheral Sensory Neuropathy: Asymptomatic (occasionally in feet)  Genitourinary/Reproductive  Genitourinary  Genitourinary (WDL): Exceptions to WDL  Urinary Frequency: Present (nocturia x2-3)  Lymphatic  Lymph System Disorders  Lymph (WDL): All lymph elements are within defined limits  Pain  Pain Score: No Pain (0)  AUA Assessment  Over the Past Month  How often have you had a sensation of not emptying your bladder completely after you have finished urinating: Less than 1 time in 5: Score:1  How often have you had to urinate again less than 2 hours after you finshed urinating: About half the time: Score: 3  How often have you found you stopped and started again several times when you urinated: Less than 1 time in 5: Score: 1  How often have you found it difficult to  postpone urine: Less than 1 time in 5: Score: 1  How often have you had a weak urinary stream: Less than 1 time in 5: Score: 1  How often have you had to push or starin to begin uriation: Not at all: Score: 0  How many times do you most typically get up to urinate from the time you went to bed at night until the time you got up in the morning?: 2 times, Score: 2  Total AUA Score: Degree of Severity: 8-19 is Moderate (9)  If you had to spend the rest of your life with your urinary condition just the way it is now, how would you feel: Mostly Satisfied  Please check the appropriate box that describes your ability to have an erection: Able to have an erection but not sufficient to haev intercourse                                                           Accompanied by  Accompanied By: self only    Objective:     PHYSICAL EXAMINATION:    BP (!) 152/94 (BP Location: Right arm, Patient Position: Sitting, Cuff Size: Adult Regular)   Pulse 76   Temp 97.6  F (36.4  C) (Oral)   Resp 16   Wt 87.4 kg (192 lb 9.6 oz)   SpO2 100%   BMI 28.86 kg/m      Gen: Alert, in NAD  Pulm: No wheezing, stridor or respiratory distress  CV: Well-perfused, no cyanosis, no pedal edema  Back: No step-offs or pain to palpation along the thoracolumbar spine  Rectal: Deferred  : Deferred  Musculoskeletal: Normal muscle bulk and tone  Skin: Normal color and turgor  Neurologic: A/Ox3, CN II-XII intact, normal gait and station  Psychiatric: Appropriate mood and affect    Prostate Specific Antigen Screen   Date Value Ref Range Status   04/25/2022 4.11 0.00 - 6.50 ug/L Final   09/07/2021 5.03 0.00 - 6.50 ug/L Final   08/27/2020 3.6 0.0 - 6.5 ng/mL Final   08/23/2019 4.2 0.0 - 4.5 ng/mL Final     PSA Tumor Marker   Date Value Ref Range Status   03/06/2023 0.04 0.00 - 6.50 ng/mL Final   09/16/2022 7.30 (H) 0.00 - 6.50 ng/mL Final      Impression     The patient is a 73-year-old gentleman with a diagnosis of unfavorable intermediate risk prostate  cancer who received 6 months hormone therapy and definitive external beam radiation therapy completed radiation treatment 2 months ago.  The patient has been recovering well with no clinical evidence of cancer recurrence. AUA: 9/35    Assessment & Plan:     1.  Patient wished to alternate his follow-up with urology and his PCP every 6 months.  Patient will set up time with Dr. True Mahajan, PCP in 6 months for follow-up and PSA.    2.  Follow-up with radiation oncology in 1 year for office follow-up and PSA.    Face to face time  15 minutes with > 80% spent on consultation, education and coordination of care.      Ciarra Pham MD, PhD  Department of Radiation Oncology   [facility]  Tel: [phone]  Page: [phone]    [facility]  [address] [place] [address]     [address]   [place] [address]    CC:  Patient Care Team:  True Mahajan MD as PCP - General (Internal Medicine)  True Mahajan MD as Assigned PCP  Pato Toure MD as MD (Urology)  Ciarra Pham MD as MD (Radiation Oncology)  Ciarra Pham MD as Assigned Cancer Care Provider        Again, thank you for allowing me to participate in the care of your patient.        Sincerely,        Ciarra Pham MD

## 2023-03-08 NOTE — PROGRESS NOTES
[facility] Radiation Oncology Follow Up     Patient: Roland Dejesus  MRN: [id number]  Date of Service: 03/08/2023       DISEASE TREATED:  Unfavorable intermediate risk prostate cancer, clinical stage T2 a N0 M0, Sirena grade 4+3 = 7 and 3+3 = 6 involving left side of prostate gland and pretherapy PSA of 7.3.  Staging work-up including MRI, CT abdomen pelvics and bone scan showed no evidence of metastatic disease.      TYPE OF RADIATION THERAPY ADMINISTERED:  7020 cGy in 26 treatment given from 11/30/2022 - 1/6/2022.       INTERVAL SINCE COMPLETION OF RADIATION THERAPY: 2 months.      SUBJECTIVE:  Mr. Dejesus is a 73 year old male who  is a retired  and has been in his usual state of good health until recently.  The patient presented with recent history of elevation of PSA to around 4-5 level with rectal examination showed palpable prostate nodule for which he was seeking further evaluation.  His last PSA was measured 4.11 on 4/25/2022.  Patient underwent transrectal ultrasound study and biopsy on 7/19/2022.  The pathology showed adenocarcinoma, [place] score 4+3 = 7 involving 70% biopsy tissue in the left lateral apex and Sirena score 3+3 = 6 involving up to 15% biopsy tissue on the left side.  There is also evidence of perineural invasion.  Staging work-up including bone scan and CT abdomen pelvics showed no evidence of lymph nodes and bone metastasis.  You have discussed with the patient about various treatment options for the prostate cancer.  The patient was initially seen and evaluated on 8/23/2022.  Patient then had staging MRI prostate on 9/12/2022.  This showed PI-RADS 5 lesion in the left posterior lateral peripheral zone with no evidence of lymph nodes and bone metastasis. The patient elected to proceed with 6 months hormone therapy (given on 9/19/2022) and definitive external beam radiation therapy for his prostate cancer and had radiation therapy in our clinic with a total dose of 7020  cGy in 26 treatment given from 11/30/2022 - 1/6/2022. The patient tolerated ration therapy reasonably well with a minimal acute side effect.     The patient has been recovering well since completion of the radiation therapy.  He is bowel and bladder functions has been returned to his normal status.  The patient is here for routine post therapy office follow-up.    Medications were reviewed and are up to date on EPIC.    The following portions of the patient's history were reviewed and updated as appropriate: allergies, current medications, past family history, past medical history, past social history, past surgical history and problem list.    Review of Systems:      General  Constitutional  Constitutional (WDL): All constitutional elements are within defined limits  EENT  Eye Disorders  Eye Disorder (WDL): Exceptions to WDL (wears glasses)  Dry Eye: Asymptomatic OR clinical or diagnostic observations only OR symptoms relieved by lubricants (occasionally)  Ear Disorders  Ear Disorder (WDL): All ear disorder elements are within defined limits  Respiratory  Respiratory  Respiratory (WDL): All respiratory elements are within defined limits  Cardiovascular  Cardiovascular  Cardiovascular (WDL): All cardiovascular elements are within defined limits (no pacemaker)  Gastrointestinal  Gastrointestinal  Gastrointestinal (WDL): All gastrointestinal elements are within defined limits  Musculoskeletal  Musculoskeletal and Connective Tissue Disorders  Musculoskeletal & Connective (WDL): All musculoskeletal & connective elements are within defined limits  Integumentary  Integumentary  Integumentary (WDL): All integumentary elements are within defined limits  Neurological  Neurosensory  Neurosensory (WDL): Exceptions to WDL  Peripheral Sensory Neuropathy: Asymptomatic (occasionally in feet)  Genitourinary/Reproductive  Genitourinary  Genitourinary (WDL): Exceptions to WDL  Urinary Frequency: Present (nocturia  x2-3)  Lymphatic  Lymph System Disorders  Lymph (WDL): All lymph elements are within defined limits  Pain  Pain Score: No Pain (0)  AUA Assessment  Over the Past Month  How often have you had a sensation of not emptying your bladder completely after you have finished urinating: Less than 1 time in 5: Score:1  How often have you had to urinate again less than 2 hours after you finshed urinating: About half the time: Score: 3  How often have you found you stopped and started again several times when you urinated: Less than 1 time in 5: Score: 1  How often have you found it difficult to postpone urine: Less than 1 time in 5: Score: 1  How often have you had a weak urinary stream: Less than 1 time in 5: Score: 1  How often have you had to push or starin to begin uriation: Not at all: Score: 0  How many times do you most typically get up to urinate from the time you went to bed at night until the time you got up in the morning?: 2 times, Score: 2  Total AUA Score: Degree of Severity: 8-19 is Moderate (9)  If you had to spend the rest of your life with your urinary condition just the way it is now, how would you feel: Mostly Satisfied  Please check the appropriate box that describes your ability to have an erection: Able to have an erection but not sufficient to haev intercourse                                                           Accompanied by  Accompanied By: self only    Objective:     PHYSICAL EXAMINATION:    BP (!) 152/94 (BP Location: Right arm, Patient Position: Sitting, Cuff Size: Adult Regular)   Pulse 76   Temp 97.6  F (36.4  C) (Oral)   Resp 16   Wt 87.4 kg (192 lb 9.6 oz)   SpO2 100%   BMI 28.86 kg/m      Gen: Alert, in NAD  Pulm: No wheezing, stridor or respiratory distress  CV: Well-perfused, no cyanosis, no pedal edema  Back: No step-offs or pain to palpation along the thoracolumbar spine  Rectal: Deferred  : Deferred  Musculoskeletal: Normal muscle bulk and tone  Skin: Normal color and  turgor  Neurologic: A/Ox3, CN II-XII intact, normal gait and station  Psychiatric: Appropriate mood and affect    Prostate Specific Antigen Screen   Date Value Ref Range Status   04/25/2022 4.11 0.00 - 6.50 ug/L Final   09/07/2021 5.03 0.00 - 6.50 ug/L Final   08/27/2020 3.6 0.0 - 6.5 ng/mL Final   08/23/2019 4.2 0.0 - 4.5 ng/mL Final     PSA Tumor Marker   Date Value Ref Range Status   03/06/2023 0.04 0.00 - 6.50 ng/mL Final   09/16/2022 7.30 (H) 0.00 - 6.50 ng/mL Final      Impression     The patient is a 73-year-old gentleman with a diagnosis of unfavorable intermediate risk prostate cancer who received 6 months hormone therapy and definitive external beam radiation therapy completed radiation treatment 2 months ago.  The patient has been recovering well with no clinical evidence of cancer recurrence. AUA: 9/35    Assessment & Plan:     1.  Patient wished to alternate his follow-up with urology and his PCP every 6 months.  Patient will set up time with Dr. True Mahajan, PCP in 6 months for follow-up and PSA.    2.  Follow-up with radiation oncology in 1 year for office follow-up and PSA.    Face to face time  15 minutes with > 80% spent on consultation, education and coordination of care.      Ciarra Pham MD, PhD  Department of Radiation Oncology   [facility]  Tel: [phone]  Page: [phone]    [facility]  [address] [place] [address]     [address]   [place] [address]    CC:  Patient Care Team:  True Mahajan MD as PCP - General (Internal Medicine)  True Mahajan MD as Assigned PCP  Pato Toure MD as MD (Urology)  Ciarra Pham MD as MD (Radiation Oncology)  Ciarra Pham MD as Assigned Cancer Care Provider

## 2023-07-03 ENCOUNTER — IMMUNIZATION (OUTPATIENT)
Dept: NURSING | Facility: CLINIC | Age: 73
End: 2023-07-03
Payer: COMMERCIAL

## 2023-07-03 PROCEDURE — 91312 COVID-19 BIVALENT 12+ (PFIZER): CPT

## 2023-07-03 PROCEDURE — 0124A COVID-19 BIVALENT 12+ (PFIZER): CPT

## 2023-08-22 DIAGNOSIS — I10 BENIGN ESSENTIAL HYPERTENSION: ICD-10-CM

## 2023-08-23 RX ORDER — LISINOPRIL 20 MG/1
20 TABLET ORAL DAILY
Qty: 90 TABLET | Refills: 1 | Status: SHIPPED | OUTPATIENT
Start: 2023-08-23 | End: 2024-03-07

## 2023-08-23 NOTE — TELEPHONE ENCOUNTER
"Routing refill request to provider for review/approval because:  BP out of range in past 12 months    Last Written Prescription Date:  9/23/2022  Last Fill Quantity: 90,  # refills: 3   Last office visit: 9/23/2022           Requested Prescriptions   Pending Prescriptions Disp Refills    lisinopril (ZESTRIL) 20 MG tablet [Pharmacy Med Name: Lisinopril Oral Tablet 20 MG] 90 tablet 0     Sig: TAKE ONE TABLET BY MOUTH ONE TIME DAILY       ACE Inhibitors (Including Combos) Protocol Failed - 8/22/2023  1:21 PM        Failed - Blood pressure under 140/90 in past 12 months     BP Readings from Last 3 Encounters:   03/08/23 (!) 152/94   01/04/23 (!) 151/76   12/27/22 (!) 162/79                 Passed - Recent (12 mo) or future (30 days) visit within the authorizing provider's specialty     Patient has had an office visit with the authorizing provider or a provider within the authorizing providers department within the previous 12 mos or has a future within next 30 days. See \"Patient Info\" tab in inbasket, or \"Choose Columns\" in Meds & Orders section of the refill encounter.              Passed - Medication is active on med list        Passed - Patient is age 18 or older        Passed - Normal serum creatinine on file in past 12 months     Recent Labs   Lab Test 09/23/22  0841   CR 0.83       Ok to refill medication if creatinine is low          Passed - Normal serum potassium on file in past 12 months     Recent Labs   Lab Test 09/23/22  0841   POTASSIUM 4.3                  [place] Paige RN 08/23/23 3:47 AM  "

## 2023-09-19 ASSESSMENT — ENCOUNTER SYMPTOMS
WEAKNESS: 0
CHILLS: 0
COUGH: 0
SORE THROAT: 0
FREQUENCY: 0
HEMATOCHEZIA: 0
HEARTBURN: 0
FEVER: 0
HEADACHES: 0
PALPITATIONS: 0
DYSURIA: 0
ABDOMINAL PAIN: 0
ARTHRALGIAS: 0
NERVOUS/ANXIOUS: 0
EYE PAIN: 0
PARESTHESIAS: 0
DIARRHEA: 0
DIZZINESS: 0
MYALGIAS: 0
SHORTNESS OF BREATH: 0
HEMATURIA: 0
NAUSEA: 0
JOINT SWELLING: 0
CONSTIPATION: 0

## 2023-09-19 ASSESSMENT — ACTIVITIES OF DAILY LIVING (ADL): CURRENT_FUNCTION: NO ASSISTANCE NEEDED

## 2023-09-26 ENCOUNTER — OFFICE VISIT (OUTPATIENT)
Dept: INTERNAL MEDICINE | Facility: CLINIC | Age: 73
End: 2023-09-26
Payer: COMMERCIAL

## 2023-09-26 VITALS
RESPIRATION RATE: 16 BRPM | HEART RATE: 62 BPM | DIASTOLIC BLOOD PRESSURE: 88 MMHG | BODY MASS INDEX: 29.1 KG/M2 | SYSTOLIC BLOOD PRESSURE: 138 MMHG | TEMPERATURE: 97.9 F | HEIGHT: 68 IN | WEIGHT: 192 LBS | OXYGEN SATURATION: 97 %

## 2023-09-26 DIAGNOSIS — Z00.00 ROUTINE GENERAL MEDICAL EXAMINATION AT A HEALTH CARE FACILITY: Primary | ICD-10-CM

## 2023-09-26 DIAGNOSIS — K40.90 UNILATERAL INGUINAL HERNIA WITHOUT OBSTRUCTION OR GANGRENE, RECURRENCE NOT SPECIFIED: ICD-10-CM

## 2023-09-26 DIAGNOSIS — Z23 INFLUENZA VACCINATION ADMINISTERED AT CURRENT VISIT: ICD-10-CM

## 2023-09-26 DIAGNOSIS — C61 PROSTATE CANCER (H): ICD-10-CM

## 2023-09-26 DIAGNOSIS — Z12.11 SCREEN FOR COLON CANCER: ICD-10-CM

## 2023-09-26 DIAGNOSIS — I10 BENIGN ESSENTIAL HYPERTENSION: ICD-10-CM

## 2023-09-26 LAB
ALBUMIN SERPL BCG-MCNC: 4.4 G/DL (ref 3.5–5.2)
ALP SERPL-CCNC: 54 U/L (ref 40–129)
ALT SERPL W P-5'-P-CCNC: 13 U/L (ref 0–70)
ANION GAP SERPL CALCULATED.3IONS-SCNC: 12 MMOL/L (ref 7–15)
AST SERPL W P-5'-P-CCNC: 18 U/L (ref 0–45)
BILIRUB SERPL-MCNC: 1 MG/DL
BUN SERPL-MCNC: 13.7 MG/DL (ref 8–23)
CALCIUM SERPL-MCNC: 9.3 MG/DL (ref 8.8–10.2)
CHLORIDE SERPL-SCNC: 99 MMOL/L (ref 98–107)
CREAT SERPL-MCNC: 0.81 MG/DL (ref 0.67–1.17)
DEPRECATED HCO3 PLAS-SCNC: 26 MMOL/L (ref 22–29)
EGFRCR SERPLBLD CKD-EPI 2021: >90 ML/MIN/1.73M2
GLUCOSE SERPL-MCNC: 100 MG/DL (ref 70–99)
HGB BLD-MCNC: 14.5 G/DL (ref 13.3–17.7)
POTASSIUM SERPL-SCNC: 4.3 MMOL/L (ref 3.4–5.3)
PROT SERPL-MCNC: 7 G/DL (ref 6.4–8.3)
PSA SERPL DL<=0.01 NG/ML-MCNC: 0.21 NG/ML (ref 0–6.5)
SODIUM SERPL-SCNC: 137 MMOL/L (ref 135–145)
TSH SERPL DL<=0.005 MIU/L-ACNC: 3.19 UIU/ML (ref 0.3–4.2)

## 2023-09-26 PROCEDURE — 80053 COMPREHEN METABOLIC PANEL: CPT | Performed by: INTERNAL MEDICINE

## 2023-09-26 PROCEDURE — 90662 IIV NO PRSV INCREASED AG IM: CPT | Performed by: INTERNAL MEDICINE

## 2023-09-26 PROCEDURE — 84153 ASSAY OF PSA TOTAL: CPT | Performed by: INTERNAL MEDICINE

## 2023-09-26 PROCEDURE — G0008 ADMIN INFLUENZA VIRUS VAC: HCPCS | Performed by: INTERNAL MEDICINE

## 2023-09-26 PROCEDURE — 84443 ASSAY THYROID STIM HORMONE: CPT | Performed by: INTERNAL MEDICINE

## 2023-09-26 PROCEDURE — 85018 HEMOGLOBIN: CPT | Performed by: INTERNAL MEDICINE

## 2023-09-26 PROCEDURE — G0439 PPPS, SUBSEQ VISIT: HCPCS | Performed by: INTERNAL MEDICINE

## 2023-09-26 PROCEDURE — 36415 COLL VENOUS BLD VENIPUNCTURE: CPT | Performed by: INTERNAL MEDICINE

## 2023-09-26 PROCEDURE — 99214 OFFICE O/P EST MOD 30 MIN: CPT | Mod: 25 | Performed by: INTERNAL MEDICINE

## 2023-09-26 ASSESSMENT — ENCOUNTER SYMPTOMS
CONSTIPATION: 0
HEADACHES: 0
HEARTBURN: 0
EYE PAIN: 0
HEMATOCHEZIA: 0
ABDOMINAL PAIN: 0
DIARRHEA: 0
NAUSEA: 0
FREQUENCY: 0
MYALGIAS: 0
HEMATURIA: 0
COUGH: 0
ARTHRALGIAS: 0
JOINT SWELLING: 0
SORE THROAT: 0
FEVER: 0
SHORTNESS OF BREATH: 0
PALPITATIONS: 0
DYSURIA: 0
DIZZINESS: 0
WEAKNESS: 0
CHILLS: 0
NERVOUS/ANXIOUS: 0
PARESTHESIAS: 0

## 2023-09-26 ASSESSMENT — ACTIVITIES OF DAILY LIVING (ADL): CURRENT_FUNCTION: NO ASSISTANCE NEEDED

## 2023-09-26 NOTE — PATIENT INSTRUCTIONS
Annual wellness visit    Roland is doing well, he completed his radiation treatment in January 2023  Reports bladder function is good.  Continues on Flomax.  Lisinopril 20 mg a day for blood pressure.  This morning's blood pressure is not too bad at 138/88, target 130/80 or less.  We will set him up for nurse visit so we can have his home blood pressure machine validated.    We will have him swing by the laboratory for PSA level, and routine labs of comprehensive metabolic panel, hemoglobin level, lipid panel, TSH for thyroid.  Due for his 3-year Cologuard stool test    We will administer his seasonal flu shot  He can hold off on COVID booster since he had bivalent booster July 2023    Localized prostate cancer, completed combined modality external beam radiation and hormonal treatment (one injection leuprolide) January 2023, under the guidance of Dr. Ankit lopez at [facility] oncology.  Unfavorable intermediate risk prostate cancer, clinical stage T2 a N0 M0, Sirena grade 4+3 = 7 and 3+3 = 6 involving left side of prostate gland and pretherapy PSA of 7.3.   RADIATION THERAPY ADMINISTERED:  7020 cGy in 26 treatment given from 11/30/2022 - 1/6/2023.        Small prostate nodule on the left side September 2021  PSA was measured 4.11 on 4/25/2022.  Patient underwent transrectal ultrasound study and biopsy on 7/19/2022.  The pathology showed adenocarcinoma, Sirena score 4+3 = 7 involving 70% biopsy tissue in the left lateral apex and Sirena score 3+3 = 6 involving up to 15% biopsy tissue on the left side.  There is also evidence of perineural invasion.  Staging work-up including bone scan and CT abdomen pelvics showed no evidence of lymph nodes and bone metastasis.  You have discussed with the patient about various treatment options for the prostate cancer.  The patient was initially seen and evaluated on 8/23/2022.  Patient then had staging MRI prostate on 9/12/2022.  This showed PI-RADS 5 lesion in the left posterior  lateral peripheral zone with no evidence of lymph nodes and bone metastasis.       Mild bladder obstructive symptoms continue trying the Flomax (tamsulosin)     Diverticulosis and small right inguinal hernia, which were incidental findings on his CT scan abdomen August 5, 2022.  No inflammation was seen associated with the diverticula.  The hernias not symptomatically bothersome for him.     Constipation-- not a problem now  MiraLAX seem to help      Negative Cologuard test September 22, 2020, good for 3 years-- recheck 2023  He is never had a colonoscopy.   COLOGUARD_EXT    Negative     Hernia inguinal right not too bothersome, size approximate 3-4 cm, easily reducible  Noticed more as he lost weight  Hernia is approximately 3-4 cm, easily reducible, does not enter the scrotum.     He does not seem to be bothering him too much.  I told him that if it does start to become bothersome, please let me know, then I would send him for general surgery consultation which then might lead to surgical repair which could be done using a laparoscopic robotic technique, as an outpatient procedure, with excellent recovery time.     Benign essential hypertension, September 2022  reduced lisinopril dose from 30 down to 20 mg once a day,  tamsulosin for his bladder, and that has some blood pressure lowering effects.     He stopped an herbal tea that may have contained a stimulant    I recommended that he have a nurse visit where he can bring his home blood pressure machine and for validation against a manual reading on his right upper arm, to make sure it is accurate, then he can trust the numbers.  The goal is to keep his blood pressure systolic measured at rest in the seated position 130 or less, diastolic around 80 or less    I reminded Roland of the importance of healthy eating, controlling sodium intake (try to keep below 2000 mg/day), minimize or preferably avoid alcohol, exercise copiously, control weight     BP Readings from  Last 6 Encounters:   09/26/23 138/88   03/08/23 (!) 152/94   01/04/23 (!) 151/76   12/27/22 (!) 162/79   12/20/22 (!) 158/80   12/13/22 (!) 156/82     Mildly overweight  BMI now 29     Wt Readings from Last 5 Encounters:   09/26/23 87.1 kg (192 lb)   03/08/23 87.4 kg (192 lb 9.6 oz)   01/04/23 86 kg (189 lb 11.2 oz)   12/27/22 85.5 kg (188 lb 9.6 oz)   12/20/22 85.5 kg (188 lb 6.4 oz)     Gastroesophageal reflux, occasional famotidine    History of cervical radiculopathic neck pain November 2019 which resolved after administration of a steroid Dosepak.      History of right patella fracture in approximately 1999, has been managed conservatively ever since, and Roland reports that the right knee is somewhat stiff, but his mobility is good, he can walk as far as he wants, and still bicycles     basal cell carcinoma, removed by Mohs surgery [facility] November 2020     Varicose veins right leg, history of vein stripping surgery in approximately 1998, not symptomatically bothersome, but he does have compression stockings that I encouraged him to use     Pneumococcal vaccines are done.    He got a COVID bivalent booster July 3, 2023, less than 2 months ago, so I think he can hold off on another COVID booster, probably through the end of 2023.    He could consider getting the 2 shot recombinant shingles vaccine at a community pharmacy since that is paid under the Medicare prescription drug benefit       Pneumo Conj 13-V (2010&after) 11/16/2018   Pneumococcal, Unspecified 01/23/2020      COVID-19 Bivalent 12+ (Pfizer) 09/23/2022, 07/03/2023       Immunization History   Administered Date(s) Administered    COVID-19 Bivalent 12+ (Pfizer) 09/23/2022, 07/03/2023    COVID-19 MONOVALENT 12+ (Pfizer) 03/02/2021, 03/23/2021, 05/01/2022    COVID-19 Monovalent 18+ (Moderna) 11/04/2021    COVID-19 Monovalent Booster 18+ (Moderna) 04/25/2022    HepA, Unspecified 08/18/1994, 09/20/1994    HepB, Unspecified 06/02/1994,  07/14/1994, 12/28/1994    Hepatitis A (ADULT 19+) 12/02/1995    Influenza (High Dose) 3 valent vaccine 11/16/2018, 10/25/2019    Influenza Vaccine 65+ (Fluzone HD) 09/26/2020, 09/11/2021, 09/23/2022    Pneumo Conj 13-V (2010&after) 11/16/2018    Pneumococcal 23 valent 02/01/2022    Pneumococcal, Unspecified 01/23/2020    Poliovirus, inactivated (IPV) 09/08/1992    TDAP (Adacel,Boostrix) 08/23/2019    Td,adult,historic,unspecified 09/08/1992    Typhoid, Unspecified Formulation 07/14/1994

## 2023-09-26 NOTE — PROGRESS NOTES
"SUBJECTIVE:   Roland is a 73 year old who presents for Preventive Visit.      9/26/2023     7:20 AM   Additional Questions   Roomed by Viridiana DUNCAN       Are you in the first 12 months of your Medicare coverage?  No    Healthy Habits:     In general, how would you rate your overall health?  Good    Frequency of exercise:  2-3 days/week    Duration of exercise:  45-60 minutes    Do you usually eat at least 4 servings of fruit and vegetables a day, include whole grains    & fiber and avoid regularly eating high fat or \"junk\" foods?  Yes    Taking medications regularly:  Yes    Medication side effects:  None    Ability to successfully perform activities of daily living:  No assistance needed    Home Safety:  No safety concerns identified    Hearing Impairment:  Difficulty following a conversation in a noisy restaurant or crowded room    In the past 6 months, have you been bothered by leaking of urine?  No    In general, how would you rate your overall mental or emotional health?  Good    Additional concerns today:  No      Today's PHQ-2 Score:       9/25/2023    10:17 AM   PHQ-2 ( 1999 Pfizer)   Q1: Little interest or pleasure in doing things 0   Q2: Feeling down, depressed or hopeless 0   PHQ-2 Score 0   Q1: Little interest or pleasure in doing things Not at all   Q2: Feeling down, depressed or hopeless Not at all   PHQ-2 Score 0           Have you ever done Advance Care Planning? (For example, a Health Directive, POLST, or a discussion with a medical provider or your loved ones about your wishes): Yes, patient states has an Advance Care Planning document and will bring a copy to the clinic.       Fall risk  Fallen 2 or more times in the past year?: No  Any fall with injury in the past year?: No    Cognitive Screening   1) Repeat 3 items (Leader, Season, Table)    2) Clock draw: NORMAL  3) 3 item recall: Recalls 3 objects  Results: 3 items recalled: COGNITIVE IMPAIRMENT LESS LIKELY    Mini-CogTM Copyright S Fouzia. " Licensed by the author for use in [facility]; reprinted with permission (fe@[facility]). All rights reserved.      Do you have sleep apnea, excessive snoring or daytime drowsiness? : no    Reviewed and updated as needed this visit by clinical staff   Tobacco  Allergies  Meds              Reviewed and updated as needed this visit by Provider     Meds             Social History     Tobacco Use    Smoking status: Former    Smokeless tobacco: Never   Substance Use Topics    Alcohol use: Yes     Alcohol/week: 3.0 standard drinks of alcohol           9/19/2023    10:19 AM   Alcohol Use   Prescreen: >3 drinks/day or >7 drinks/week? No     Do you have a current opioid prescription? No  Do you use any other controlled substances or medications that are not prescribed by a provider? None    Current providers sharing in care for this patient include:   Patient Care Team:  True Mahajan MD as PCP - General (Internal Medicine)  True Mahajan MD as Assigned PCP  Pato Toure MD as MD (Urology)  Ciarra Pham MD as MD (Radiation Oncology)  Ciarra Pham MD as Assigned Cancer Care Provider    The following health maintenance items are reviewed in Epic and correct as of today:  Health Maintenance   Topic Date Due    ANNUAL REVIEW OF HM ORDERS  Never done    ZOSTER IMMUNIZATION (1 of 2) Never done    LUNG CANCER SCREENING  Never done    COVID-19 Vaccine (8 - Mixed Product risk series) 08/28/2023    INFLUENZA VACCINE (1) 09/01/2023    COLORECTAL CANCER SCREENING  09/22/2023    MEDICARE ANNUAL WELLNESS VISIT  09/23/2023    FALL RISK ASSESSMENT  09/26/2024    LIPID  09/23/2027    ADVANCE CARE PLANNING  09/26/2028    DTAP/TDAP/TD IMMUNIZATION (2 - Td or Tdap) 08/23/2029    HEPATITIS C SCREENING  Completed    PHQ-2 (once per calendar year)  Completed    Pneumococcal Vaccine: 65+ Years  Completed    AORTIC ANEURYSM SCREENING (SYSTEM ASSIGNED)  Completed    IPV IMMUNIZATION  Aged Out    HPV IMMUNIZATION  Aged  "Out    MENINGITIS IMMUNIZATION  Aged Out       Review of Systems   Constitutional:  Negative for chills and fever.   HENT:  Negative for congestion, ear pain, hearing loss and sore throat.    Eyes:  Negative for pain and visual disturbance.   Respiratory:  Negative for cough and shortness of breath.    Cardiovascular:  Negative for chest pain, palpitations and peripheral edema.   Gastrointestinal:  Negative for abdominal pain, constipation, diarrhea, heartburn, hematochezia and nausea.   Genitourinary:  Negative for dysuria, frequency, genital sores, hematuria and urgency.   Musculoskeletal:  Negative for arthralgias, joint swelling and myalgias.   Skin:  Negative for rash.   Neurological:  Negative for dizziness, weakness, headaches and paresthesias.   Psychiatric/Behavioral:  Negative for mood changes. The patient is not nervous/anxious.        OBJECTIVE:   /88 (BP Location: Right arm, Patient Position: Sitting)   Pulse 62   Temp 97.9  F (36.6  C)   Resp 16   Ht 1.727 m (5' 8\")   Wt 87.1 kg (192 lb)   SpO2 97%   BMI 29.19 kg/m   Estimated body mass index is 29.19 kg/m  as calculated from the following:    Height as of this encounter: 1.727 m (5' 8\").    Weight as of this encounter: 87.1 kg (192 lb).  Physical Exam    General: Alert, in no distress  Skin: No significant lesion seen.  Eyes/nose/throat: Eyes without scleral icterus, eye movements normal, pupils equal and reactive, oropharynx clear, ears with normal TM's  MSK: Neck with good ROM  Lymphatic: Neck without adenopathy or masses  Endocrine: Thyroid with no nodules to palpation  Pulm: Lungs clear to auscultation bilaterally  Cardiac: Heart with regular rate and rhythm, no murmur or gallop  GI: Abdomen soft, nontender. No palpable enlargement of liver or spleen  MSK: Extremities no tenderness or edema  Neuro: Moves all extremities, without focal weakness  Psych: Alert, normal mental status. Normal affect and speech       ASSESSMENT / PLAN: "     Annual wellness visit    Roland is doing well, he completed his radiation treatment in January 2023  Reports bladder function is good.  Continues on Flomax.  Lisinopril 20 mg a day for blood pressure.  This morning's blood pressure is not too bad at 138/88, target 130/80 or less.  We will set him up for nurse visit so we can have his home blood pressure machine validated.    We will have him swing by the laboratory for PSA level, and routine labs of comprehensive metabolic panel, hemoglobin level, lipid panel, TSH for thyroid.  Due for his 3-year Cologuard stool test    We will administer his seasonal flu shot  He can hold off on COVID booster since he had bivalent booster July 2023    Localized prostate cancer, completed combined modality external beam radiation and hormonal treatment (one injection leuprolide) January 2023, under the guidance of Dr. Ankit lopez at [facility] oncology.  Unfavorable intermediate risk prostate cancer, clinical stage T2 a N0 M0, Brick grade 4+3 = 7 and 3+3 = 6 involving left side of prostate gland and pretherapy PSA of 7.3.   RADIATION THERAPY ADMINISTERED:  7020 cGy in 26 treatment given from 11/30/2022 - 1/6/2023.        Small prostate nodule on the left side September 2021  PSA was measured 4.11 on 4/25/2022.  Patient underwent transrectal ultrasound study and biopsy on 7/19/2022.  The pathology showed adenocarcinoma, Brick score 4+3 = 7 involving 70% biopsy tissue in the left lateral apex and Brick score 3+3 = 6 involving up to 15% biopsy tissue on the left side.  There is also evidence of perineural invasion.  Staging work-up including bone scan and CT abdomen pelvics showed no evidence of lymph nodes and bone metastasis.  You have discussed with the patient about various treatment options for the prostate cancer.  The patient was initially seen and evaluated on 8/23/2022.  Patient then had staging MRI prostate on 9/12/2022.  This showed PI-RADS 5 lesion in the left  posterior lateral peripheral zone with no evidence of lymph nodes and bone metastasis.       Mild bladder obstructive symptoms continue trying the Flomax (tamsulosin)     Diverticulosis and small right inguinal hernia, which were incidental findings on his CT scan abdomen August 5, 2022.  No inflammation was seen associated with the diverticula.  The hernias not symptomatically bothersome for him.     Constipation-- not a problem now  MiraLAX seem to help      Negative Cologuard test September 22, 2020, good for 3 years-- recheck 2023  He is never had a colonoscopy.   COLOGUARD_EXT    Negative     Hernia inguinal right not too bothersome, size approximate 3-4 cm, easily reducible  Noticed more as he lost weight  Hernia is approximately 3-4 cm, easily reducible, does not enter the scrotum.     He does not seem to be bothering him too much.  I told him that if it does start to become bothersome, please let me know, then I would send him for general surgery consultation which then might lead to surgical repair which could be done using a laparoscopic robotic technique, as an outpatient procedure, with excellent recovery time.     Benign essential hypertension, September 2022  reduced lisinopril dose from 30 down to 20 mg once a day,  tamsulosin for his bladder, and that has some blood pressure lowering effects.     He stopped an herbal tea that may have contained a stimulant    I recommended that he have a nurse visit where he can bring his home blood pressure machine and for validation against a manual reading on his right upper arm, to make sure it is accurate, then he can trust the numbers.  The goal is to keep his blood pressure systolic measured at rest in the seated position 130 or less, diastolic around 80 or less    I reminded Roland of the importance of healthy eating, controlling sodium intake (try to keep below 2000 mg/day), minimize or preferably avoid alcohol, exercise copiously, control weight     BP  Readings from Last 6 Encounters:   09/26/23 138/88   03/08/23 (!) 152/94   01/04/23 (!) 151/76   12/27/22 (!) 162/79   12/20/22 (!) 158/80   12/13/22 (!) 156/82     Mildly overweight  BMI now 29     Wt Readings from Last 5 Encounters:   09/26/23 87.1 kg (192 lb)   03/08/23 87.4 kg (192 lb 9.6 oz)   01/04/23 86 kg (189 lb 11.2 oz)   12/27/22 85.5 kg (188 lb 9.6 oz)   12/20/22 85.5 kg (188 lb 6.4 oz)     Gastroesophageal reflux, occasional famotidine    History of cervical radiculopathic neck pain November 2019 which resolved after administration of a steroid Dosepak.      History of right patella fracture in approximately 1999, has been managed conservatively ever since, and Roland reports that the right knee is somewhat stiff, but his mobility is good, he can walk as far as he wants, and still bicycles     basal cell carcinoma, removed by Mohs surgery [facility] November 2020     Varicose veins right leg, history of vein stripping surgery in approximately 1998, not symptomatically bothersome, but he does have compression stockings that I encouraged him to use     Pneumococcal vaccines are done.    He got a COVID bivalent booster July 3, 2023, less than 2 months ago, so I think he can hold off on another COVID booster, probably through the end of 2023.    He could consider getting the 2 shot recombinant shingles vaccine at a community pharmacy since that is paid under the Medicare prescription drug benefit       Pneumo Conj 13-V (2010&after) 11/16/2018   Pneumococcal, Unspecified 01/23/2020      COVID-19 Bivalent 12+ (Pfizer) 09/23/2022, 07/03/2023       Immunization History   Administered Date(s) Administered    COVID-19 Bivalent 12+ (Pfizer) 09/23/2022, 07/03/2023    COVID-19 MONOVALENT 12+ (Pfizer) 03/02/2021, 03/23/2021, 05/01/2022    COVID-19 Monovalent 18+ (Moderna) 11/04/2021    COVID-19 Monovalent Booster 18+ (Moderna) 04/25/2022    HepA, Unspecified 08/18/1994, 09/20/1994    HepB, Unspecified  "06/02/1994, 07/14/1994, 12/28/1994    Hepatitis A (ADULT 19+) 12/02/1995    Influenza (High Dose) 3 valent vaccine 11/16/2018, 10/25/2019    Influenza Vaccine 65+ (Fluzone HD) 09/26/2020, 09/11/2021, 09/23/2022    Pneumo Conj 13-V (2010&after) 11/16/2018    Pneumococcal 23 valent 02/01/2022    Pneumococcal, Unspecified 01/23/2020    Poliovirus, inactivated (IPV) 09/08/1992    TDAP (Adacel,Boostrix) 08/23/2019    Td,adult,historic,unspecified 09/08/1992    Typhoid, Unspecified Formulation 07/14/1994          COUNSELING:  Reviewed preventive health counseling, as reflected in patient instructions       Healthy diet/nutrition      BMI:   Estimated body mass index is 29.19 kg/m  as calculated from the following:    Height as of this encounter: 1.727 m (5' 8\").    Weight as of this encounter: 87.1 kg (192 lb).   Weight management plan: Discussed healthy diet and exercise guidelines      He reports that he has quit smoking. He has never used smokeless tobacco.      Appropriate preventive services were discussed with this patient, including applicable screening as appropriate for cardiovascular disease, diabetes, osteopenia/osteoporosis, and glaucoma.  As appropriate for age/gender, discussed screening for colorectal cancer, prostate cancer, breast cancer, and cervical cancer. Checklist reviewing preventive services available has been given to the patient.    Reviewed patients plan of care and provided an AVS. The Basic Care Plan (routine screening as documented in Health Maintenance) for Roland meets the Care Plan requirement. This Care Plan has been established and reviewed with the Patient.      OLGA CASTAÑEDA MD  [facility]    Identified Health Risks:  I have reviewed Opioid Use Disorder and Substance Use Disorder risk factors and made any needed referrals.   "

## 2023-09-27 ENCOUNTER — LAB (OUTPATIENT)
Dept: INTERNAL MEDICINE | Facility: CLINIC | Age: 73
End: 2023-09-27
Payer: COMMERCIAL

## 2023-09-27 DIAGNOSIS — Z12.11 SCREEN FOR COLON CANCER: ICD-10-CM

## 2023-09-28 ENCOUNTER — MYC MEDICAL ADVICE (OUTPATIENT)
Dept: INTERNAL MEDICINE | Facility: CLINIC | Age: 73
End: 2023-09-28
Payer: COMMERCIAL

## 2023-10-07 LAB — NONINV COLON CA DNA+OCC BLD SCRN STL QL: NEGATIVE

## 2023-10-23 ENCOUNTER — ALLIED HEALTH/NURSE VISIT (OUTPATIENT)
Dept: FAMILY MEDICINE | Facility: CLINIC | Age: 73
End: 2023-10-23
Payer: COMMERCIAL

## 2023-10-23 VITALS — HEART RATE: 60 BPM | DIASTOLIC BLOOD PRESSURE: 78 MMHG | SYSTOLIC BLOOD PRESSURE: 142 MMHG

## 2023-10-23 DIAGNOSIS — Z01.30 BLOOD PRESSURE CHECK: Primary | ICD-10-CM

## 2023-10-23 PROCEDURE — 99207 PR NO CHARGE NURSE ONLY: CPT

## 2023-10-23 NOTE — PROGRESS NOTES
"Roland Dejesus is a 73 year old year old patient who comes in today for a Blood Pressure check because of check his home machine with our blood pressure machine.  Vital Signs as repeated by Lisa KNOWLES RN  Patient is taking medication as prescribed  Patient is tolerating medications well.  Patient is monitoring Blood Pressure at home.  Once a week pt is taking his blood pressure. Average readings if yes are 127/69 HR 76.  Current complaints: none  Disposition:  note routed to provider to review. Pt educated that his current home monitor is accurate, and to retake later today. Pt states this AM had a diet coke and he states he may have a slight case of \"white coat syndrome\" If he continues to have elevated BP at home, to notify clinic. Pt states understanding. Pt denies headache, CP, SOB, any acute vision changes.            /78 manual HR 60 manual right arm sitting    /87 HR 58 his bp machine right arm sitting after several minutes     Pt states that he is too light headed around 120 systolic and that when he went on flomax he went down to 20 mg of lisinopril from 30mg around 9/23/22.   "

## 2023-11-27 DIAGNOSIS — C61 MALIGNANT NEOPLASM OF PROSTATE (H): ICD-10-CM

## 2023-11-27 RX ORDER — TAMSULOSIN HYDROCHLORIDE 0.4 MG/1
0.4 CAPSULE ORAL AT BEDTIME
Qty: 90 CAPSULE | Refills: 3 | Status: SHIPPED | OUTPATIENT
Start: 2023-11-27

## 2024-03-07 DIAGNOSIS — I10 BENIGN ESSENTIAL HYPERTENSION: ICD-10-CM

## 2024-03-07 RX ORDER — LISINOPRIL 20 MG/1
20 TABLET ORAL DAILY
Qty: 90 TABLET | Refills: 3 | Status: SHIPPED | OUTPATIENT
Start: 2024-03-07

## 2024-03-14 ENCOUNTER — LAB (OUTPATIENT)
Dept: INFUSION THERAPY | Facility: HOSPITAL | Age: 74
End: 2024-03-14
Attending: RADIOLOGY
Payer: COMMERCIAL

## 2024-03-14 DIAGNOSIS — C61 PROSTATE CANCER (H): ICD-10-CM

## 2024-03-14 LAB — PSA SERPL DL<=0.01 NG/ML-MCNC: 0.24 NG/ML (ref 0–6.5)

## 2024-03-14 PROCEDURE — 84153 ASSAY OF PSA TOTAL: CPT

## 2024-03-14 PROCEDURE — 36415 COLL VENOUS BLD VENIPUNCTURE: CPT

## 2024-03-20 ENCOUNTER — OFFICE VISIT (OUTPATIENT)
Dept: RADIATION ONCOLOGY | Facility: CLINIC | Age: 74
End: 2024-03-20
Attending: RADIOLOGY
Payer: COMMERCIAL

## 2024-03-20 VITALS
BODY MASS INDEX: 29.53 KG/M2 | WEIGHT: 194.2 LBS | SYSTOLIC BLOOD PRESSURE: 135 MMHG | OXYGEN SATURATION: 98 % | DIASTOLIC BLOOD PRESSURE: 65 MMHG | HEART RATE: 65 BPM | TEMPERATURE: 97.5 F | RESPIRATION RATE: 16 BRPM

## 2024-03-20 DIAGNOSIS — C61 PROSTATE CANCER (H): Primary | ICD-10-CM

## 2024-03-20 PROCEDURE — 99213 OFFICE O/P EST LOW 20 MIN: CPT | Performed by: RADIOLOGY

## 2024-03-20 PROCEDURE — G0463 HOSPITAL OUTPT CLINIC VISIT: HCPCS | Performed by: RADIOLOGY

## 2024-03-20 PROCEDURE — G2211 COMPLEX E/M VISIT ADD ON: HCPCS | Performed by: RADIOLOGY

## 2024-03-20 ASSESSMENT — PAIN SCALES - GENERAL: PAINLEVEL: NO PAIN (0)

## 2024-03-20 NOTE — LETTER
3/20/2024         RE: Roland Dejesus  [address] [facility] [address]        Dear Colleague,    Thank you for referring your patient, Roland Dejesus, to the [facility] RADIATION ONCOLOGY [place]. Please see a copy of my visit note below.    [facility] Radiation Oncology Follow Up     Patient: Roland Dejesus  MRN: [id number]  Date of Service: 03/20/2024       DISEASE TREATED:  Unfavorable intermediate risk prostate cancer, clinical stage T2 a N0 M0, [place] grade 4+3 = 7 and 3+3 = 6 involving left side of prostate gland and pretherapy PSA of 7.3.  Staging work-up including MRI, CT abdomen pelvics and bone scan showed no evidence of metastatic disease.      TYPE OF RADIATION THERAPY ADMINISTERED:  7020 cGy in 26 treatment given from 11/30/2022 - 1/6/2023.       INTERVAL SINCE COMPLETION OF RADIATION THERAPY: 1 year and 2 months.      SUBJECTIVE:  Mr. Dejesus is a 74 year old male who  is a retired  and has been in his usual state of good health until recently.  The patient presented with recent history of elevation of PSA to around 4-5 level with rectal examination showed palpable prostate nodule for which he was seeking further evaluation.  His last PSA was measured 4.11 on 4/25/2022.  Patient underwent transrectal ultrasound study and biopsy on 7/19/2022.  The pathology showed adenocarcinoma, [place] score 4+3 = 7 involving 70% biopsy tissue in the left lateral apex and Sirena score 3+3 = 6 involving up to 15% biopsy tissue on the left side.  There is also evidence of perineural invasion.  Staging work-up including bone scan and CT abdomen pelvics showed no evidence of lymph nodes and bone metastasis.  You have discussed with the patient about various treatment options for the prostate cancer.  The patient was initially seen and evaluated on 8/23/2022.  Patient then had staging MRI prostate on 9/12/2022.  This showed PI-RADS 5 lesion in the left posterior lateral peripheral zone  with no evidence of lymph nodes and bone metastasis. The patient elected to proceed with 6 months hormone therapy (given on 9/19/2022) and definitive external beam radiation therapy for his prostate cancer and had radiation therapy in our clinic with a total dose of 7020 cGy in 26 treatment given from 11/30/2022 - 1/6/2023. The patient tolerated ration therapy reasonably well with a minimal acute side effect.      The patient has been recovering well since completion of the radiation therapy.  He is bowel and bladder functions has been returned to his normal status.  The patient is here for routine post therapy office follow-up.     Medications were reviewed and are up to date on EPIC.    The following portions of the patient's history were reviewed and updated as appropriate: allergies, current medications, past family history, past medical history, past social history, past surgical history and problem list.    Review of Systems:      General  Constitutional  Constitutional (WDL): All constitutional elements are within defined limits  EENT  Eye Disorders  Eye Disorder (WDL): Exceptions to WDL (wears glasses)  Dry Eye: Asymptomatic OR clinical or diagnostic observations only OR symptoms relieved by lubricants (occasionally)  Ear Disorders  Ear Disorder (WDL): All ear disorder elements are within defined limits  Respiratory  Respiratory  Respiratory (WDL): All respiratory elements are within defined limits  Cardiovascular  Cardiovascular  Cardiovascular (WDL): All cardiovascular elements are within defined limits (no pacemaker)  Gastrointestinal  Gastrointestinal  Gastrointestinal (WDL): All gastrointestinal elements are within defined limits  Musculoskeletal  Musculoskeletal and Connective Tissue Disorders  Musculoskeletal & Connective (WDL): All musculoskeletal & connective elements are within defined limits  Integumentary  Integumentary  Integumentary (WDL): All integumentary elements are within defined  limits  Neurological  Neurosensory  Neurosensory (WDL): Exceptions to WDL  Peripheral Sensory Neuropathy: Asymptomatic (occasionally in feet)  Genitourinary/Reproductive  Genitourinary  Genitourinary (WDL): Exceptions to WDL  Urinary Frequency: Present (nocturia x2-3)  Lymphatic  Lymph System Disorders  Lymph (WDL): All lymph elements are within defined limits  Pain  Pain Score: No Pain (0)  AUA Assessment                                                              Accompanied by  Accompanied By: self only    Objective:      PHYSICAL EXAMINATION:    Wt 88.1 kg (194 lb 3.2 oz)   BMI 29.53 kg/m      Gen: Alert, in NAD  Eyes: PERRL, EOMI, sclera anicteric  Neck: Supple, full ROM, no LAD  Pulm: No wheezing, stridor or respiratory distress  CV: Well-perfused, no cyanosis, no pedal edema  Back: No step-offs or pain to palpation along the thoracolumbar spine  Rectal: Deferred  : Deferred  Musculoskeletal: Normal muscle bulk and tone  Skin: Normal color and turgor  Neurologic: A/Ox3, CN II-XII intact, normal gait and station  Psychiatric: Appropriate mood and affect     Prostate Specific Antigen Screen   Date Value Ref Range Status   04/25/2022 4.11 0.00 - 6.50 ug/L Final   09/07/2021 5.03 0.00 - 6.50 ug/L Final   08/27/2020 3.6 0.0 - 6.5 ng/mL Final   08/23/2019 4.2 0.0 - 4.5 ng/mL Final     PSA Tumor Marker   Date Value Ref Range Status   03/14/2024 0.24 0.00 - 6.50 ng/mL Final   09/26/2023 0.21 0.00 - 6.50 ng/mL Final   03/06/2023 0.04 0.00 - 6.50 ng/mL Final   09/16/2022 7.30 (H) 0.00 - 6.50 ng/mL Final      Impression     The patient is a 74-year-old gentleman with a diagnosis of unfavorable intermediate risk prostate cancer who received 6 months hormone therapy and definitive external beam radiation therapy completed radiation treatment 1 year and 2 months ago.  The patient has been recovering well with no clinical evidence of cancer recurrence. AUA: 5/35     Assessment & Plan:     1.  Patient wished to alternate  "his follow-up with urology and his PCP every 6 months.  Patient will set up time with Dr. True Mahajan, PCP in 6 months for follow-up and PSA.     2.  Follow-up with radiation oncology in 1 year for office follow-up and PSA.     Face to face time 20 minutes with > 80% spent on consultation, education and coordination of care.         Ciarra Pham MD  Department of Radiation Oncology   [facility]  Tel: [phone]  Page: [phone]    [facility]  [address] [place] [address]     [facility]   [address] [facility] [place] [address]    CC:  Patient Care Team:  True Mahajan MD as PCP - General (Internal Medicine)  True Mahajan MD as Assigned PCP  Pato Toure MD as MD (Urology)  Ciarra Pham MD as MD (Radiation Oncology)  Ciarra Pham MD as Assigned Cancer Care Provider      Oncology Rooming Note    March 20, 2024 1:29 PM   Roland Dejesus is a 74 year old male who presents for:    Chief Complaint   Patient presents with     Oncology Clinic Visit     Follow up with Dr. Pham     Initial Vitals: /65 (BP Location: Right arm, Patient Position: Sitting, Cuff Size: Adult Regular)   Pulse 65   Temp 97.5  F (36.4  C) (Oral)   Resp 16   Wt 88.1 kg (194 lb 3.2 oz)   SpO2 98%   BMI 29.53 kg/m   Estimated body mass index is 29.53 kg/m  as calculated from the following:    Height as of 9/26/23: 1.727 m (5' 8\").    Weight as of this encounter: 88.1 kg (194 lb 3.2 oz). Body surface area is 2.06 meters squared.  No Pain (0) Comment: Data Unavailable   No LMP for male patient.  Allergies reviewed: Yes  Medications reviewed: Yes    Medications: Medication refills not needed today.  Pharmacy name entered into [employer]: [id number] [place], [place] - [address] [facility]    Frailty Screening:   Is the patient here for a new oncology consult visit in cancer care? 2. No      Clinical concerns: Patient here ambulatory for follow-up status post radiation for his prostate cancer.  " PSA and a UA completed.  Patient denies side effects.  Seen by Dr. Pham.  Plan return to clinic for follow-up as directed by provider.   Dr. Pham was notified.      Radha Pagan RN                Again, thank you for allowing me to participate in the care of your patient.        Sincerely,        Ciarra Pham MD

## 2024-03-20 NOTE — PROGRESS NOTES
[facility] Radiation Oncology Follow Up     Patient: Roland Dejeuss  MRN: [id number]  Date of Service: 03/20/2024       DISEASE TREATED:  Unfavorable intermediate risk prostate cancer, clinical stage T2 a N0 M0, Sirena grade 4+3 = 7 and 3+3 = 6 involving left side of prostate gland and pretherapy PSA of 7.3.  Staging work-up including MRI, CT abdomen pelvics and bone scan showed no evidence of metastatic disease.      TYPE OF RADIATION THERAPY ADMINISTERED:  7020 cGy in 26 treatment given from 11/30/2022 - 1/6/2023.       INTERVAL SINCE COMPLETION OF RADIATION THERAPY: 1 year and 2 months.      SUBJECTIVE:  Mr. Dejesus is a 74 year old male who  is a retired  and has been in his usual state of good health until recently.  The patient presented with recent history of elevation of PSA to around 4-5 level with rectal examination showed palpable prostate nodule for which he was seeking further evaluation.  His last PSA was measured 4.11 on 4/25/2022.  Patient underwent transrectal ultrasound study and biopsy on 7/19/2022.  The pathology showed adenocarcinoma, [place] score 4+3 = 7 involving 70% biopsy tissue in the left lateral apex and Minden score 3+3 = 6 involving up to 15% biopsy tissue on the left side.  There is also evidence of perineural invasion.  Staging work-up including bone scan and CT abdomen pelvics showed no evidence of lymph nodes and bone metastasis.  You have discussed with the patient about various treatment options for the prostate cancer.  The patient was initially seen and evaluated on 8/23/2022.  Patient then had staging MRI prostate on 9/12/2022.  This showed PI-RADS 5 lesion in the left posterior lateral peripheral zone with no evidence of lymph nodes and bone metastasis. The patient elected to proceed with 6 months hormone therapy (given on 9/19/2022) and definitive external beam radiation therapy for his prostate cancer and had radiation therapy in our clinic with a total dose  of 7020 cGy in 26 treatment given from 11/30/2022 - 1/6/2023. The patient tolerated ration therapy reasonably well with a minimal acute side effect.      The patient has been recovering well since completion of the radiation therapy.  He is bowel and bladder functions has been returned to his normal status.  The patient is here for routine post therapy office follow-up.     Medications were reviewed and are up to date on EPIC.    The following portions of the patient's history were reviewed and updated as appropriate: allergies, current medications, past family history, past medical history, past social history, past surgical history and problem list.    Review of Systems:      General  Constitutional  Constitutional (WDL): All constitutional elements are within defined limits  EENT  Eye Disorders  Eye Disorder (WDL): Exceptions to WDL (wears glasses)  Dry Eye: Asymptomatic OR clinical or diagnostic observations only OR symptoms relieved by lubricants (occasionally)  Ear Disorders  Ear Disorder (WDL): All ear disorder elements are within defined limits  Respiratory  Respiratory  Respiratory (WDL): All respiratory elements are within defined limits  Cardiovascular  Cardiovascular  Cardiovascular (WDL): All cardiovascular elements are within defined limits (no pacemaker)  Gastrointestinal  Gastrointestinal  Gastrointestinal (WDL): All gastrointestinal elements are within defined limits  Musculoskeletal  Musculoskeletal and Connective Tissue Disorders  Musculoskeletal & Connective (WDL): All musculoskeletal & connective elements are within defined limits  Integumentary  Integumentary  Integumentary (WDL): All integumentary elements are within defined limits  Neurological  Neurosensory  Neurosensory (WDL): Exceptions to WDL  Peripheral Sensory Neuropathy: Asymptomatic (occasionally in feet)  Genitourinary/Reproductive  Genitourinary  Genitourinary (WDL): Exceptions to WDL  Urinary Frequency: Present (nocturia  x2-3)  Lymphatic  Lymph System Disorders  Lymph (WDL): All lymph elements are within defined limits  Pain  Pain Score: No Pain (0)  AUA Assessment                                                              Accompanied by  Accompanied By: self only    Objective:      PHYSICAL EXAMINATION:    Wt 88.1 kg (194 lb 3.2 oz)   BMI 29.53 kg/m      Gen: Alert, in NAD  Eyes: PERRL, EOMI, sclera anicteric  Neck: Supple, full ROM, no LAD  Pulm: No wheezing, stridor or respiratory distress  CV: Well-perfused, no cyanosis, no pedal edema  Back: No step-offs or pain to palpation along the thoracolumbar spine  Rectal: Deferred  : Deferred  Musculoskeletal: Normal muscle bulk and tone  Skin: Normal color and turgor  Neurologic: A/Ox3, CN II-XII intact, normal gait and station  Psychiatric: Appropriate mood and affect     Prostate Specific Antigen Screen   Date Value Ref Range Status   04/25/2022 4.11 0.00 - 6.50 ug/L Final   09/07/2021 5.03 0.00 - 6.50 ug/L Final   08/27/2020 3.6 0.0 - 6.5 ng/mL Final   08/23/2019 4.2 0.0 - 4.5 ng/mL Final     PSA Tumor Marker   Date Value Ref Range Status   03/14/2024 0.24 0.00 - 6.50 ng/mL Final   09/26/2023 0.21 0.00 - 6.50 ng/mL Final   03/06/2023 0.04 0.00 - 6.50 ng/mL Final   09/16/2022 7.30 (H) 0.00 - 6.50 ng/mL Final      Impression     The patient is a 74-year-old gentleman with a diagnosis of unfavorable intermediate risk prostate cancer who received 6 months hormone therapy and definitive external beam radiation therapy completed radiation treatment 1 year and 2 months ago.  The patient has been recovering well with no clinical evidence of cancer recurrence. AUA: 5/35     Assessment & Plan:     1.  Patient wished to alternate his follow-up with urology and his PCP every 6 months.  Patient will set up time with Dr. True Mahajan, PCP in 6 months for follow-up and PSA.     2.  Follow-up with radiation oncology in 1 year for office follow-up and PSA.     Face to face time 20 minutes with  > 80% spent on consultation, education and coordination of care.         Ciarra Pham MD  Department of Radiation Oncology   [facility]  Tel: [phone]  Page: [phone]    [facility]  [address] [id number]     [address] [place] [address]    CC:  Patient Care Team:  True Mahajan MD as PCP - General (Internal Medicine)  True Mahajan MD as Assigned PCP  Pato Toure MD as MD (Urology)  Ciarra Pham MD as MD (Radiation Oncology)  Ciarra Pham MD as Assigned Cancer Care Provider

## 2024-03-20 NOTE — PROGRESS NOTES
"Oncology Rooming Note    March 20, 2024 1:29 PM   Roland Dejesus is a 74 year old male who presents for:    Chief Complaint   Patient presents with    Oncology Clinic Visit     Follow up with Dr. Pham     Initial Vitals: /65 (BP Location: Right arm, Patient Position: Sitting, Cuff Size: Adult Regular)   Pulse 65   Temp 97.5  F (36.4  C) (Oral)   Resp 16   Wt 88.1 kg (194 lb 3.2 oz)   SpO2 98%   BMI 29.53 kg/m   Estimated body mass index is 29.53 kg/m  as calculated from the following:    Height as of 9/26/23: 1.727 m (5' 8\").    Weight as of this encounter: 88.1 kg (194 lb 3.2 oz). Body surface area is 2.06 meters squared.  No Pain (0) Comment: Data Unavailable   No LMP for male patient.  Allergies reviewed: Yes  Medications reviewed: Yes    Medications: Medication refills not needed today.  Pharmacy name entered into [employer]: [id number] [place], [place] - [address] [facility] [address] [facility]    Frailty Screening:   Is the patient here for a new oncology consult visit in cancer care? 2. No      Clinical concerns: Patient here ambulatory for follow-up status post radiation for his prostate cancer.  PSA and a UA completed.  Patient denies side effects.  Seen by Dr. Pham.  Plan return to clinic for follow-up as directed by provider.   Dr. Pham was notified.      Radha Pagan RN              "

## 2024-10-02 ENCOUNTER — TRANSFERRED RECORDS (OUTPATIENT)
Dept: HEALTH INFORMATION MANAGEMENT | Facility: CLINIC | Age: 74
End: 2024-10-02
Payer: COMMERCIAL

## 2024-10-19 SDOH — HEALTH STABILITY: PHYSICAL HEALTH: ON AVERAGE, HOW MANY DAYS PER WEEK DO YOU ENGAGE IN MODERATE TO STRENUOUS EXERCISE (LIKE A BRISK WALK)?: 6 DAYS

## 2024-10-19 SDOH — HEALTH STABILITY: PHYSICAL HEALTH: ON AVERAGE, HOW MANY MINUTES DO YOU ENGAGE IN EXERCISE AT THIS LEVEL?: 60 MIN

## 2024-10-19 ASSESSMENT — SOCIAL DETERMINANTS OF HEALTH (SDOH): HOW OFTEN DO YOU GET TOGETHER WITH FRIENDS OR RELATIVES?: TWICE A WEEK

## 2024-10-22 ENCOUNTER — OFFICE VISIT (OUTPATIENT)
Dept: INTERNAL MEDICINE | Facility: CLINIC | Age: 74
End: 2024-10-22
Payer: COMMERCIAL

## 2024-10-22 VITALS
TEMPERATURE: 97.9 F | DIASTOLIC BLOOD PRESSURE: 88 MMHG | HEIGHT: 68 IN | WEIGHT: 188.6 LBS | BODY MASS INDEX: 28.58 KG/M2 | OXYGEN SATURATION: 98 % | RESPIRATION RATE: 18 BRPM | SYSTOLIC BLOOD PRESSURE: 122 MMHG | HEART RATE: 62 BPM

## 2024-10-22 DIAGNOSIS — Z12.5 SCREENING PSA (PROSTATE SPECIFIC ANTIGEN): ICD-10-CM

## 2024-10-22 DIAGNOSIS — Z00.00 ROUTINE GENERAL MEDICAL EXAMINATION AT A HEALTH CARE FACILITY: ICD-10-CM

## 2024-10-22 DIAGNOSIS — I10 BENIGN ESSENTIAL HYPERTENSION: Primary | ICD-10-CM

## 2024-10-22 DIAGNOSIS — Z85.46 PERSONAL HISTORY OF PROSTATE CANCER: ICD-10-CM

## 2024-10-22 DIAGNOSIS — K40.90 UNILATERAL INGUINAL HERNIA WITHOUT OBSTRUCTION OR GANGRENE, RECURRENCE NOT SPECIFIED: ICD-10-CM

## 2024-10-22 LAB
ALBUMIN SERPL BCG-MCNC: 4.2 G/DL (ref 3.5–5.2)
ALP SERPL-CCNC: 50 U/L (ref 40–150)
ALT SERPL W P-5'-P-CCNC: 16 U/L (ref 0–70)
ANION GAP SERPL CALCULATED.3IONS-SCNC: 9 MMOL/L (ref 7–15)
AST SERPL W P-5'-P-CCNC: 22 U/L (ref 0–45)
BILIRUB SERPL-MCNC: 1.2 MG/DL
BUN SERPL-MCNC: 13.9 MG/DL (ref 8–23)
CALCIUM SERPL-MCNC: 9.7 MG/DL (ref 8.8–10.4)
CHLORIDE SERPL-SCNC: 102 MMOL/L (ref 98–107)
CHOLEST SERPL-MCNC: 164 MG/DL
CREAT SERPL-MCNC: 0.95 MG/DL (ref 0.67–1.17)
EGFRCR SERPLBLD CKD-EPI 2021: 84 ML/MIN/1.73M2
ERYTHROCYTE [DISTWIDTH] IN BLOOD BY AUTOMATED COUNT: 13.6 % (ref 10–15)
EST. AVERAGE GLUCOSE BLD GHB EST-MCNC: 111 MG/DL
FASTING STATUS PATIENT QL REPORTED: YES
FASTING STATUS PATIENT QL REPORTED: YES
GLUCOSE SERPL-MCNC: 102 MG/DL (ref 70–99)
HBA1C MFR BLD: 5.5 % (ref 0–5.6)
HCO3 SERPL-SCNC: 27 MMOL/L (ref 22–29)
HCT VFR BLD AUTO: 43.8 % (ref 40–53)
HDLC SERPL-MCNC: 77 MG/DL
HGB BLD-MCNC: 14.7 G/DL (ref 13.3–17.7)
LDLC SERPL CALC-MCNC: 75 MG/DL
MCH RBC QN AUTO: 31.5 PG (ref 26.5–33)
MCHC RBC AUTO-ENTMCNC: 33.6 G/DL (ref 31.5–36.5)
MCV RBC AUTO: 94 FL (ref 78–100)
NONHDLC SERPL-MCNC: 87 MG/DL
PLATELET # BLD AUTO: 186 10E3/UL (ref 150–450)
POTASSIUM SERPL-SCNC: 4.3 MMOL/L (ref 3.4–5.3)
PROT SERPL-MCNC: 7 G/DL (ref 6.4–8.3)
PSA SERPL DL<=0.01 NG/ML-MCNC: 0.2 NG/ML (ref 0–6.5)
RBC # BLD AUTO: 4.67 10E6/UL (ref 4.4–5.9)
SODIUM SERPL-SCNC: 138 MMOL/L (ref 135–145)
TRIGL SERPL-MCNC: 59 MG/DL
WBC # BLD AUTO: 4.5 10E3/UL (ref 4–11)

## 2024-10-22 PROCEDURE — G0439 PPPS, SUBSEQ VISIT: HCPCS | Performed by: INTERNAL MEDICINE

## 2024-10-22 PROCEDURE — 80053 COMPREHEN METABOLIC PANEL: CPT | Performed by: INTERNAL MEDICINE

## 2024-10-22 PROCEDURE — 85027 COMPLETE CBC AUTOMATED: CPT | Performed by: INTERNAL MEDICINE

## 2024-10-22 PROCEDURE — 83036 HEMOGLOBIN GLYCOSYLATED A1C: CPT | Performed by: INTERNAL MEDICINE

## 2024-10-22 PROCEDURE — 36415 COLL VENOUS BLD VENIPUNCTURE: CPT | Performed by: INTERNAL MEDICINE

## 2024-10-22 PROCEDURE — 80061 LIPID PANEL: CPT | Performed by: INTERNAL MEDICINE

## 2024-10-22 PROCEDURE — G0103 PSA SCREENING: HCPCS | Performed by: INTERNAL MEDICINE

## 2024-10-22 NOTE — PROGRESS NOTES
Preventive Care Visit  [facility]  OLGA CASTAÑEDA MD, Internal Medicine  Oct 22, 2024    Pérez Watkins is a 74 year old, presenting for the following:  Annual Visit        10/22/2024     7:18 AM   Additional Questions   Roomed by SOHA Haines     Health Care Directive  Patient does not have a Health Care Directive or Living Will: Discussed advance care planning with patient; however, patient declined at this time.    HPI        10/19/2024   General Health   How would you rate your overall physical health? Good   Feel stress (tense, anxious, or unable to sleep) Not at all          10/19/2024   Nutrition   Diet: Regular (no restrictions)            10/19/2024   Exercise   Days per week of moderate/strenous exercise 6 days   Average minutes spent exercising at this level 60 min            10/19/2024   Social Factors   Frequency of gathering with friends or relatives Twice a week   Worry food won't last until get money to buy more No   Food not last or not have enough money for food? No   Do you have housing? (Housing is defined as stable permanent housing and does not include staying ouside in a car, in a tent, in an abandoned building, in an overnight shelter, or couch-surfing.) Yes   Are you worried about losing your housing? No   Lack of transportation? No   Unable to get utilities (heat,electricity)? No          10/19/2024   Fall Risk   Fallen 2 or more times in the past year? No    No   Trouble with walking or balance? No    No       Multiple values from one day are sorted in reverse-chronological order          10/19/2024   Activities of Daily Living- Home Safety   Needs help with the following daily activites None of the above   Safety concerns in the home None of the above            10/19/2024   Dental   Dentist two times every year? Yes          10/19/2024   Hearing Screening   Hearing concerns? None of the above           10/19/2024   Driving Risk Screening   Patient/family  members have concerns about driving No            10/19/2024   General Alertness/Fatigue Screening   Have you been more tired than usual lately? No            10/19/2024   Urinary Incontinence Screening   Bothered by leaking urine in past 6 months No            10/19/2024   TB Screening   Were you born outside of the [place]? No            Today's PHQ-2 Score:       10/21/2024    11:07 AM   PHQ-2 ( 1999 Pf[employer])   Q1: Little interest or pleasure in doing things 0   Q2: Feeling down, depressed or hopeless 0   PHQ-2 Score 0   Q1: Little interest or pleasure in doing things Not at all   Q2: Feeling down, depressed or hopeless Not at all   PHQ-2 Score 0           10/19/2024   Substance Use   Alcohol more than 3/day or more than 7/wk No   Do you have a current opioid prescription? No   How severe/bad is pain from 1 to 10? 2/10   Do you use any other substances recreationally? No        Social History     Tobacco Use    Smoking status: Former     Passive exposure: Never    Smokeless tobacco: Never   Vaping Use    Vaping status: Never Used   Substance Use Topics    Alcohol use: Yes     Alcohol/week: 3.0 standard drinks of alcohol    Drug use: No       ASCVD Risk   The 10-year ASCVD risk score (Omar HURLEY, et al., 2019) is: 20.2%    Values used to calculate the score:      Age: 74 years      Sex: Male      Is Non- : No      Diabetic: No      Tobacco smoker: No      Systolic Blood Pressure: 122 mmHg      Is BP treated: Yes      HDL Cholesterol: 94 mg/dL      Total Cholesterol: 201 mg/dL      Current providers sharing in care for this patient include:  Patient Care Team:  True Mahajan MD as PCP - General (Internal Medicine)  True Mahajan MD as Assigned PCP  Pato Toure MD as MD (Urology)  Ciarra Pham MD as MD (Radiation Oncology)  Ciarra Pham MD as Assigned Cancer Care Provider    The following health maintenance items are reviewed in Epic and correct as of today:  Health  "Maintenance   Topic Date Due    ANNUAL REVIEW OF HM ORDERS  Never done    LUNG CANCER SCREENING  Never done    RSV VACCINE (1 - Risk 60-74 years 1-dose series) Never done    BMP  09/26/2024    MEDICARE ANNUAL WELLNESS VISIT  09/26/2024    COVID-19 Vaccine (9 - 2024-25 season) 11/08/2024    FALL RISK ASSESSMENT  10/22/2025    GLUCOSE  09/26/2026    COLORECTAL CANCER SCREENING  10/02/2026    LIPID  09/23/2027    DTAP/TDAP/TD IMMUNIZATION (3 - Td or Tdap) 08/23/2029    ADVANCE CARE PLANNING  10/22/2029    HEPATITIS C SCREENING  Completed    PHQ-2 (once per calendar year)  Completed    INFLUENZA VACCINE  Completed    Pneumococcal Vaccine: 65+ Years  Completed    ZOSTER IMMUNIZATION  Completed    AORTIC ANEURYSM SCREENING (SYSTEM ASSIGNED)  Completed    HPV IMMUNIZATION  Aged Out    MENINGITIS IMMUNIZATION  Aged Out    RSV MONOCLONAL ANTIBODY  Aged Out         Review of Systems  Constitutional, HEENT, cardiovascular, pulmonary, gi and gu systems are negative, except as otherwise noted.     Objective    Exam  /88 (BP Location: Right arm, Patient Position: Sitting, Cuff Size: Adult Regular)   Pulse 62   Temp 97.9  F (36.6  C) (Oral)   Resp 18   Ht 1.727 m (5' 7.99\")   Wt 85.5 kg (188 lb 9.6 oz)   SpO2 98%   BMI 28.68 kg/m     Estimated body mass index is 28.68 kg/m  as calculated from the following:    Height as of this encounter: 1.727 m (5' 7.99\").    Weight as of this encounter: 85.5 kg (188 lb 9.6 oz).    Physical Exam    General: Alert, in no distress  Skin: No significant lesion seen.  Eyes/nose/throat: Eyes without scleral icterus, eye movements normal, pupils equal and reactive, oropharynx clear, ears with normal TM's  MSK: Neck with good ROM  Lymphatic: Neck without adenopathy or masses  Endocrine: Thyroid with no nodules to palpation  Pulm: Lungs clear to auscultation bilaterally  Cardiac: Heart with regular rate and rhythm, no murmur or gallop  GI: Abdomen soft, nontender. No palpable enlargement of " liver or spleen  + Hernia inguinal right size approximate 3-4 cm, easily reducible, does not enter the scrotum.  MSK: Extremities no tenderness or edema  Neuro: Moves all extremities, without focal weakness  Psych: Alert, normal mental status. Normal affect and speech     ASSESSMENT / PLAN:        Annual wellness visit    Roland is doing well, he completed his radiation treatment in January 2023  Reports bladder function is good.  Continues on Flomax.    Lisinopril 20 mg a day for blood pressure.      We will have him swing by the laboratory for PSA level, and routine labs of comprehensive metabolic panel, hemoglobin level, lipid panel    Roland plans to get the RSV vaccine at a local pharmacy.  He already got his updated COVID and flu shot September 2024, and has done his Shingrix and pneumococcal vaccines.    Localized prostate cancer, completed combined modality external beam radiation and hormonal treatment (one injection leuprolide) January 2023, under the guidance of Dr. Ankit lopez at [facility] oncology.    3-  PSA Tumor Marker  0.00 - 6.50 ng/mL 0.24     Unfavorable intermediate risk prostate cancer, clinical stage T2 a N0 M0, Sirena grade 4+3 = 7 and 3+3 = 6 involving left side of prostate gland and pretherapy PSA of 7.3.   RADIATION THERAPY ADMINISTERED:  7020 cGy in 26 treatment given from 11/30/2022 - 1/6/2023.        Small prostate nodule on the left side September 2021  PSA was measured 4.11 on 4/25/2022.  Patient underwent transrectal ultrasound study and biopsy on 7/19/2022.  The pathology showed adenocarcinoma, Sirena score 4+3 = 7 involving 70% biopsy tissue in the left lateral apex and Sirena score 3+3 = 6 involving up to 15% biopsy tissue on the left side.  There is also evidence of perineural invasion.  Staging work-up including bone scan and CT abdomen pelvics showed no evidence of lymph nodes and bone metastasis.  You have discussed with the patient about various treatment options for  the prostate cancer.  The patient was initially seen and evaluated on 8/23/2022.  Patient then had staging MRI prostate on 9/12/2022.  This showed PI-RADS 5 lesion in the left posterior lateral peripheral zone with no evidence of lymph nodes and bone metastasis.       Mild bladder obstructive symptoms continue trying the Flomax (tamsulosin)     Diverticulosis and small right inguinal hernia, which were incidental findings on his CT scan abdomen August 5, 2022.  No inflammation was seen associated with the diverticula.  The hernias not symptomatically bothersome for him.     Constipation-- not a problem now  MiraLAX seem to help      Hernia inguinal right not too bothersome, size approximate 3-4 cm, easily reducible  Noticed more as he lost weight  Hernia is approximately 3-4 cm, easily reducible, does not enter the scrotum.     He does not seem to be bothering him too much.  I told him that if it does start to become bothersome, please let me know, then I would send him for general surgery consultation which then might lead to surgical repair which could be done using a laparoscopic robotic technique, as an outpatient procedure, with excellent recovery time.    Benign essential hypertension, September 2022  reduced lisinopril dose from 30 down to 20 mg once a day,  tamsulosin for his bladder, and that has some blood pressure lowering effects.     He stopped an herbal tea that may have contained a stimulant     I recommended that he have a nurse visit where he can bring his home blood pressure machine and for validation against a manual reading on his right upper arm, to make sure it is accurate, then he can trust the numbers.  The goal is to keep his blood pressure systolic measured at rest in the seated position 130 or less, diastolic around 80 or less     I reminded Roland of the importance of healthy eating, controlling sodium intake (try to keep below 2000 mg/day), minimize or preferably avoid alcohol, exercise  copiously, control weight     BP Readings from Last 6 Encounters:   10/22/24 122/88   03/20/24 135/65   10/23/23 (!) 142/78   09/26/23 138/88   03/08/23 (!) 152/94   01/04/23 (!) 151/76     Mildly overweight  BMI now 29  Wt Readings from Last 5 Encounters:   10/22/24 85.5 kg (188 lb 9.6 oz)   03/20/24 88.1 kg (194 lb 3.2 oz)   09/26/23 87.1 kg (192 lb)   03/08/23 87.4 kg (192 lb 9.6 oz)   01/04/23 86 kg (189 lb 11.2 oz)     Gastroesophageal reflux, occasional famotidine    History of cervical radiculopathic neck pain November 2019 which resolved after administration of a steroid Dosepak.    History of right patella fracture in approximately 1999, has been managed conservatively ever since, and Roland reports that the right knee is somewhat stiff, but his mobility is good, he can walk as far as he wants, and still bicycles      Basal cell carcinoma, removed by Mohs surgery [facility] November 2020     Varicose veins right leg, history of vein stripping surgery in approximately 1998, not symptomatically bothersome, but he does have compression stockings that I encouraged him to use    Negative Cologard October 2023  Negative Cologuard test September 22, 2020  He is never had a colonoscopy.     Pneumococcal vaccines are done  I told Roland he could consider getting the RSV vaccine at a community pharmacy, since that vaccine is paid under the Medicare prescription drug benefit      Immunization History   Administered Date(s) Administered    COVID-19 12+ (Pfizer) 09/13/2024    COVID-19 Bivalent 12+ (Pfizer) 09/23/2022, 07/03/2023    COVID-19 MONOVALENT 12+ (Pfizer) 03/02/2021, 03/23/2021, 05/01/2022    COVID-19 Monovalent 18+ (Moderna) 11/04/2021    COVID-19 Monovalent Booster 18+ (Moderna) 04/25/2022    Flu 65+ (Fluad) 09/13/2024    HepA, Unspecified 08/18/1994, 09/20/1994    HepB, Unspecified 06/02/1994, 07/14/1994, 12/28/1994    Hepatitis A (ADULT 19+) 12/02/1995    Influenza (High Dose) Trivalent,PF  (Fluzone) 11/16/2018, 10/25/2019    Influenza Vaccine 65+ (Fluzone HD) 09/26/2020, 09/11/2021, 09/23/2022, 09/26/2023    Pneumo Conj 13-V (2010&after) 11/16/2018    Pneumococcal 23 valent 02/01/2022    Pneumococcal, Unspecified 01/23/2020    Poliovirus, inactivated (IPV) 09/08/1992    TDAP (Adacel,Boostrix) 08/23/2019    Td,adult,historic,unspecified 09/08/1992    Tdap (Adult) Unspecified Formulation 09/08/1992    Typhoid, Unspecified Formulation 07/14/1994    Zoster recombinant adjuvanted (SHINGRIX) 11/13/2023, 01/29/2024       Signed Electronically by: OLGA CASTAÑEDA MD

## 2024-10-22 NOTE — PATIENT INSTRUCTIONS
Annual wellness visit    Roland is doing well, he completed his radiation treatment in January 2023  Reports bladder function is good.  Continues on Flomax.    Lisinopril 20 mg a day for blood pressure.      We will have him swing by the laboratory for PSA level, and routine labs of comprehensive metabolic panel, hemoglobin level, lipid panel    Roland plans to get the RSV vaccine at a local pharmacy.  He already got his updated COVID and flu shot September 2024, and has done his Shingrix and pneumococcal vaccines.    Localized prostate cancer, completed combined modality external beam radiation and hormonal treatment (one injection leuprolide) January 2023, under the guidance of Dr. Ankit lopez at [facility] oncology.    3-  PSA Tumor Marker  0.00 - 6.50 ng/mL 0.24     Unfavorable intermediate risk prostate cancer, clinical stage T2 a N0 M0, Wymore grade 4+3 = 7 and 3+3 = 6 involving left side of prostate gland and pretherapy PSA of 7.3.   RADIATION THERAPY ADMINISTERED:  7020 cGy in 26 treatment given from 11/30/2022 - 1/6/2023.        Small prostate nodule on the left side September 2021  PSA was measured 4.11 on 4/25/2022.  Patient underwent transrectal ultrasound study and biopsy on 7/19/2022.  The pathology showed adenocarcinoma, Wymore score 4+3 = 7 involving 70% biopsy tissue in the left lateral apex and Sirena score 3+3 = 6 involving up to 15% biopsy tissue on the left side.  There is also evidence of perineural invasion.  Staging work-up including bone scan and CT abdomen pelvics showed no evidence of lymph nodes and bone metastasis.  You have discussed with the patient about various treatment options for the prostate cancer.  The patient was initially seen and evaluated on 8/23/2022.  Patient then had staging MRI prostate on 9/12/2022.  This showed PI-RADS 5 lesion in the left posterior lateral peripheral zone with no evidence of lymph nodes and bone metastasis.       Mild bladder obstructive  symptoms continue trying the Flomax (tamsulosin)     Diverticulosis and small right inguinal hernia, which were incidental findings on his CT scan abdomen August 5, 2022.  No inflammation was seen associated with the diverticula.  The hernias not symptomatically bothersome for him.     Constipation-- not a problem now  MiraLAX seem to help      Hernia inguinal right not too bothersome, size approximate 3-4 cm, easily reducible  Noticed more as he lost weight  Hernia is approximately 3-4 cm, easily reducible, does not enter the scrotum.     He does not seem to be bothering him too much.  I told him that if it does start to become bothersome, please let me know, then I would send him for general surgery consultation which then might lead to surgical repair which could be done using a laparoscopic robotic technique, as an outpatient procedure, with excellent recovery time.    Benign essential hypertension, September 2022  reduced lisinopril dose from 30 down to 20 mg once a day,  tamsulosin for his bladder, and that has some blood pressure lowering effects.     He stopped an herbal tea that may have contained a stimulant     I recommended that he have a nurse visit where he can bring his home blood pressure machine and for validation against a manual reading on his right upper arm, to make sure it is accurate, then he can trust the numbers.  The goal is to keep his blood pressure systolic measured at rest in the seated position 130 or less, diastolic around 80 or less     I reminded Roland of the importance of healthy eating, controlling sodium intake (try to keep below 2000 mg/day), minimize or preferably avoid alcohol, exercise copiously, control weight     BP Readings from Last 6 Encounters:   10/22/24 122/88   03/20/24 135/65   10/23/23 (!) 142/78   09/26/23 138/88   03/08/23 (!) 152/94   01/04/23 (!) 151/76     Mildly overweight  BMI now 29  Wt Readings from Last 5 Encounters:   10/22/24 85.5 kg (188 lb 9.6 oz)    03/20/24 88.1 kg (194 lb 3.2 oz)   09/26/23 87.1 kg (192 lb)   03/08/23 87.4 kg (192 lb 9.6 oz)   01/04/23 86 kg (189 lb 11.2 oz)     Gastroesophageal reflux, occasional famotidine    History of cervical radiculopathic neck pain November 2019 which resolved after administration of a steroid Dosepak.    History of right patella fracture in approximately 1999, has been managed conservatively ever since, and Roland reports that the right knee is somewhat stiff, but his mobility is good, he can walk as far as he wants, and still bicycles      Basal cell carcinoma, removed by Mohs surgery [facility] November 2020     Varicose veins right leg, history of vein stripping surgery in approximately 1998, not symptomatically bothersome, but he does have compression stockings that I encouraged him to use    Negative Cologard October 2023  Negative Cologuard test September 22, 2020  He is never had a colonoscopy.     Pneumococcal vaccines are done  I told Roland he could consider getting the RSV vaccine at a community pharmacy, since that vaccine is paid under the Medicare prescription drug benefit      Immunization History   Administered Date(s) Administered    COVID-19 12+ (Pfizer) 09/13/2024    COVID-19 Bivalent 12+ (Pfizer) 09/23/2022, 07/03/2023    COVID-19 MONOVALENT 12+ (Pfizer) 03/02/2021, 03/23/2021, 05/01/2022    COVID-19 Monovalent 18+ (Moderna) 11/04/2021    COVID-19 Monovalent Booster 18+ (Moderna) 04/25/2022    Flu 65+ (Fluad) 09/13/2024    HepA, Unspecified 08/18/1994, 09/20/1994    HepB, Unspecified 06/02/1994, 07/14/1994, 12/28/1994    Hepatitis A (ADULT 19+) 12/02/1995    Influenza (High Dose) Trivalent,PF (Fluzone) 11/16/2018, 10/25/2019    Influenza Vaccine 65+ (Fluzone HD) 09/26/2020, 09/11/2021, 09/23/2022, 09/26/2023    Pneumo Conj 13-V (2010&after) 11/16/2018    Pneumococcal 23 valent 02/01/2022    Pneumococcal, Unspecified 01/23/2020    Poliovirus, inactivated (IPV) 09/08/1992    TDAP  (Adacel,Boostrix) 08/23/2019    Td,adult,historic,unspecified 09/08/1992    Tdap (Adult) Unspecified Formulation 09/08/1992    Typhoid, Unspecified Formulation 07/14/1994    Zoster recombinant adjuvanted (SHINGRIX) 11/13/2023, 01/29/2024

## 2024-11-13 DIAGNOSIS — C61 MALIGNANT NEOPLASM OF PROSTATE (H): ICD-10-CM

## 2024-11-13 RX ORDER — TAMSULOSIN HYDROCHLORIDE 0.4 MG/1
0.4 CAPSULE ORAL AT BEDTIME
Qty: 90 CAPSULE | Refills: 3 | Status: SHIPPED | OUTPATIENT
Start: 2024-11-13

## 2025-03-17 ENCOUNTER — LAB (OUTPATIENT)
Dept: INFUSION THERAPY | Facility: HOSPITAL | Age: 75
End: 2025-03-17
Attending: RADIOLOGY
Payer: COMMERCIAL

## 2025-03-17 DIAGNOSIS — C61 PROSTATE CANCER (H): ICD-10-CM

## 2025-03-17 LAB — PSA SERPL DL<=0.01 NG/ML-MCNC: 0.16 NG/ML (ref 0–6.5)

## 2025-03-17 PROCEDURE — 36415 COLL VENOUS BLD VENIPUNCTURE: CPT

## 2025-03-17 PROCEDURE — 84153 ASSAY OF PSA TOTAL: CPT

## 2025-07-26 DIAGNOSIS — I10 BENIGN ESSENTIAL HYPERTENSION: ICD-10-CM

## 2025-07-28 RX ORDER — LISINOPRIL 20 MG/1
20 TABLET ORAL DAILY
Qty: 90 TABLET | Refills: 1 | Status: SHIPPED | OUTPATIENT
Start: 2025-07-28